# Patient Record
Sex: FEMALE | Race: WHITE | NOT HISPANIC OR LATINO | Employment: FULL TIME | URBAN - METROPOLITAN AREA
[De-identification: names, ages, dates, MRNs, and addresses within clinical notes are randomized per-mention and may not be internally consistent; named-entity substitution may affect disease eponyms.]

---

## 2023-03-06 ENCOUNTER — TELEPHONE (OUTPATIENT)
Dept: PRIMARY CARE | Facility: CLINIC | Age: 43
End: 2023-03-06

## 2023-03-06 RX ORDER — LOVASTATIN 20 MG/1
1 TABLET ORAL DAILY
COMMUNITY
Start: 2020-10-29 | End: 2023-05-03 | Stop reason: SDUPTHER

## 2023-03-06 RX ORDER — CITALOPRAM 10 MG/1
1 TABLET ORAL DAILY
COMMUNITY
Start: 2022-08-31 | End: 2023-05-03 | Stop reason: SDUPTHER

## 2023-03-06 RX ORDER — VIT C/ZN GLUC/HERBAL NO.325 90 MG-15MG
LOZENGE MUCOUS MEMBRANE
COMMUNITY
Start: 2021-11-12

## 2023-03-06 RX ORDER — ENALAPRIL MALEATE 2.5 MG/1
1 TABLET ORAL DAILY
COMMUNITY
Start: 2016-03-26 | End: 2023-05-03 | Stop reason: SDUPTHER

## 2023-03-06 NOTE — TELEPHONE ENCOUNTER
Rx Refill Request Telephone Encounter    Name:  Sandy Nguyen  :  [id number]  Medication Name:  citalopram hydro 10mg 1 tablet daily          Specific Pharmacy location:   Date of last appointment:    Date of next appointment:    Best number to reach patient:

## 2023-05-03 ENCOUNTER — LAB (OUTPATIENT)
Dept: LAB | Facility: LAB | Age: 43
End: 2023-05-03
Payer: COMMERCIAL

## 2023-05-03 ENCOUNTER — OFFICE VISIT (OUTPATIENT)
Dept: PRIMARY CARE | Facility: CLINIC | Age: 43
End: 2023-05-03
Payer: COMMERCIAL

## 2023-05-03 VITALS — BODY MASS INDEX: 32.28 KG/M2 | SYSTOLIC BLOOD PRESSURE: 124 MMHG | WEIGHT: 191 LBS | DIASTOLIC BLOOD PRESSURE: 76 MMHG

## 2023-05-03 DIAGNOSIS — E78.41 ELEVATED LIPOPROTEIN(A): ICD-10-CM

## 2023-05-03 DIAGNOSIS — F32.9 REACTIVE DEPRESSION: ICD-10-CM

## 2023-05-03 DIAGNOSIS — I10 PRIMARY HYPERTENSION: ICD-10-CM

## 2023-05-03 DIAGNOSIS — I10 PRIMARY HYPERTENSION: Primary | ICD-10-CM

## 2023-05-03 LAB
ALANINE AMINOTRANSFERASE (SGPT) (U/L) IN SER/PLAS: 20 U/L (ref 7–45)
ALBUMIN (G/DL) IN SER/PLAS: 4.6 G/DL (ref 3.4–5)
ALKALINE PHOSPHATASE (U/L) IN SER/PLAS: 90 U/L (ref 33–110)
ANION GAP IN SER/PLAS: 12 MMOL/L (ref 10–20)
ASPARTATE AMINOTRANSFERASE (SGOT) (U/L) IN SER/PLAS: 22 U/L (ref 9–39)
BILIRUBIN TOTAL (MG/DL) IN SER/PLAS: 0.4 MG/DL (ref 0–1.2)
CALCIUM (MG/DL) IN SER/PLAS: 9.5 MG/DL (ref 8.6–10.3)
CARBON DIOXIDE, TOTAL (MMOL/L) IN SER/PLAS: 27 MMOL/L (ref 21–32)
CHLORIDE (MMOL/L) IN SER/PLAS: 105 MMOL/L (ref 98–107)
CHOLESTEROL (MG/DL) IN SER/PLAS: 191 MG/DL (ref 0–199)
CHOLESTEROL IN HDL (MG/DL) IN SER/PLAS: 59.9 MG/DL
CHOLESTEROL/HDL RATIO: 3.2
CREATININE (MG/DL) IN SER/PLAS: 0.66 MG/DL (ref 0.5–1.05)
ERYTHROCYTE DISTRIBUTION WIDTH (RATIO) BY AUTOMATED COUNT: 12.7 % (ref 11.5–14.5)
ERYTHROCYTE MEAN CORPUSCULAR HEMOGLOBIN CONCENTRATION (G/DL) BY AUTOMATED: 33.9 G/DL (ref 32–36)
ERYTHROCYTE MEAN CORPUSCULAR VOLUME (FL) BY AUTOMATED COUNT: 94 FL (ref 80–100)
ERYTHROCYTES (10*6/UL) IN BLOOD BY AUTOMATED COUNT: 4.91 X10E12/L (ref 4–5.2)
GFR FEMALE: >90 ML/MIN/1.73M2
GLUCOSE (MG/DL) IN SER/PLAS: 94 MG/DL (ref 74–99)
HEMATOCRIT (%) IN BLOOD BY AUTOMATED COUNT: 46 % (ref 36–46)
HEMOGLOBIN (G/DL) IN BLOOD: 15.6 G/DL (ref 12–16)
LDL: 114 MG/DL (ref 0–99)
LEUKOCYTES (10*3/UL) IN BLOOD BY AUTOMATED COUNT: 5.7 X10E9/L (ref 4.4–11.3)
PLATELETS (10*3/UL) IN BLOOD AUTOMATED COUNT: 395 X10E9/L (ref 150–450)
POTASSIUM (MMOL/L) IN SER/PLAS: 4.2 MMOL/L (ref 3.5–5.3)
PROTEIN TOTAL: 7.1 G/DL (ref 6.4–8.2)
SODIUM (MMOL/L) IN SER/PLAS: 140 MMOL/L (ref 136–145)
THYROTROPIN (MIU/L) IN SER/PLAS BY DETECTION LIMIT <= 0.05 MIU/L: 0.54 MIU/L (ref 0.44–3.98)
TRIGLYCERIDE (MG/DL) IN SER/PLAS: 86 MG/DL (ref 0–149)
UREA NITROGEN (MG/DL) IN SER/PLAS: 7 MG/DL (ref 6–23)
VLDL: 17 MG/DL (ref 0–40)

## 2023-05-03 PROCEDURE — 99213 OFFICE O/P EST LOW 20 MIN: CPT | Performed by: FAMILY MEDICINE

## 2023-05-03 PROCEDURE — 80061 LIPID PANEL: CPT

## 2023-05-03 PROCEDURE — 3078F DIAST BP <80 MM HG: CPT | Performed by: FAMILY MEDICINE

## 2023-05-03 PROCEDURE — 84443 ASSAY THYROID STIM HORMONE: CPT

## 2023-05-03 PROCEDURE — 80053 COMPREHEN METABOLIC PANEL: CPT

## 2023-05-03 PROCEDURE — 3074F SYST BP LT 130 MM HG: CPT | Performed by: FAMILY MEDICINE

## 2023-05-03 PROCEDURE — 85027 COMPLETE CBC AUTOMATED: CPT

## 2023-05-03 PROCEDURE — 36415 COLL VENOUS BLD VENIPUNCTURE: CPT

## 2023-05-03 RX ORDER — TRIAMCINOLONE ACETONIDE 1 MG/G
CREAM TOPICAL
COMMUNITY
Start: 2023-01-13

## 2023-05-03 RX ORDER — ENALAPRIL MALEATE 2.5 MG/1
2.5 TABLET ORAL DAILY
Qty: 90 TABLET | Refills: 3 | Status: SHIPPED | OUTPATIENT
Start: 2023-05-03 | End: 2023-06-28 | Stop reason: ALTCHOICE

## 2023-05-03 RX ORDER — CITALOPRAM 10 MG/1
10 TABLET ORAL DAILY
Qty: 90 TABLET | Refills: 3 | Status: SHIPPED | OUTPATIENT
Start: 2023-05-03 | End: 2023-09-27 | Stop reason: SDUPTHER

## 2023-05-03 RX ORDER — LOVASTATIN 20 MG/1
20 TABLET ORAL DAILY
Qty: 90 TABLET | Refills: 3 | Status: SHIPPED | OUTPATIENT
Start: 2023-05-03 | End: 2023-05-04 | Stop reason: ALTCHOICE

## 2023-05-03 RX ORDER — PHENTERMINE HYDROCHLORIDE 37.5 MG/1
37.5 TABLET ORAL
COMMUNITY
End: 2023-08-02 | Stop reason: SDUPTHER

## 2023-05-03 ASSESSMENT — ENCOUNTER SYMPTOMS
EYES NEGATIVE: 1
CARDIOVASCULAR NEGATIVE: 1
CONSTITUTIONAL NEGATIVE: 1
RESPIRATORY NEGATIVE: 1
NERVOUS/ANXIOUS: 1
ALLERGIC/IMMUNOLOGIC NEGATIVE: 1
NEUROLOGICAL NEGATIVE: 1
ENDOCRINE NEGATIVE: 1
MUSCULOSKELETAL NEGATIVE: 1
HEMATOLOGIC/LYMPHATIC NEGATIVE: 1
GASTROINTESTINAL NEGATIVE: 1

## 2023-05-03 NOTE — PROGRESS NOTES
Subjective   Patient ID: Sandy Nguyen is a 42 y.o. female who presents for Follow-up (4 mo follow up ).    ROV         Review of Systems   Constitutional: Negative.    HENT: Negative.     Eyes: Negative.    Respiratory: Negative.     Cardiovascular: Negative.    Gastrointestinal: Negative.    Endocrine: Negative.    Genitourinary: Negative.    Musculoskeletal: Negative.    Skin: Negative.    Allergic/Immunologic: Negative.    Neurological: Negative.    Hematological: Negative.    Psychiatric/Behavioral:  The patient is nervous/anxious.        Objective   /76 (BP Location: Right arm, Patient Position: Sitting)   Wt 86.6 kg (191 lb)   BMI 32.28 kg/m²     Physical Exam  Constitutional:       Appearance: She is obese.   HENT:      Head: Normocephalic and atraumatic.      Nose: Nose normal.   Eyes:      Pupils: Pupils are equal, round, and reactive to light.   Cardiovascular:      Rate and Rhythm: Normal rate and regular rhythm.   Pulmonary:      Effort: Pulmonary effort is normal.      Breath sounds: Normal breath sounds.   Musculoskeletal:         General: Normal range of motion.      Cervical back: Normal range of motion.   Skin:     General: Skin is warm.   Neurological:      General: No focal deficit present.      Mental Status: She is alert.   Psychiatric:         Mood and Affect: Mood normal.         Assessment/Plan

## 2023-05-03 NOTE — PATIENT INSTRUCTIONS
Rx , lab s, FUWOD's , contd meds , diet ,daily X's ,stop smoking , weight reduction / weight loss clinic, tcbx 1wk , rto x 4m

## 2023-05-04 DIAGNOSIS — E78.41 ELEVATED LIPOPROTEIN(A): Primary | ICD-10-CM

## 2023-05-04 RX ORDER — LOVASTATIN 20 MG/1
20 TABLET ORAL DAILY
Qty: 90 TABLET | Refills: 3 | Status: CANCELLED | OUTPATIENT
Start: 2023-05-04

## 2023-05-05 RX ORDER — LOVASTATIN 40 MG/1
40 TABLET ORAL DAILY
Qty: 90 TABLET | Refills: 3 | Status: SHIPPED | OUTPATIENT
Start: 2023-05-05 | End: 2023-06-28

## 2023-06-28 ENCOUNTER — OFFICE VISIT (OUTPATIENT)
Dept: PRIMARY CARE | Facility: CLINIC | Age: 43
End: 2023-06-28
Payer: COMMERCIAL

## 2023-06-28 VITALS
OXYGEN SATURATION: 98 % | HEART RATE: 93 BPM | SYSTOLIC BLOOD PRESSURE: 118 MMHG | WEIGHT: 185 LBS | BODY MASS INDEX: 30.82 KG/M2 | HEIGHT: 65 IN | DIASTOLIC BLOOD PRESSURE: 82 MMHG | RESPIRATION RATE: 16 BRPM

## 2023-06-28 DIAGNOSIS — F41.9 ANXIETY: ICD-10-CM

## 2023-06-28 DIAGNOSIS — E78.00 PURE HYPERCHOLESTEROLEMIA: ICD-10-CM

## 2023-06-28 DIAGNOSIS — E66.09 CLASS 1 OBESITY DUE TO EXCESS CALORIES WITHOUT SERIOUS COMORBIDITY WITH BODY MASS INDEX (BMI) OF 31.0 TO 31.9 IN ADULT: ICD-10-CM

## 2023-06-28 DIAGNOSIS — F32.9 REACTIVE DEPRESSION: Primary | ICD-10-CM

## 2023-06-28 DIAGNOSIS — I10 PRIMARY HYPERTENSION: ICD-10-CM

## 2023-06-28 PROBLEM — E61.1 IRON DEFICIENCY: Status: ACTIVE | Noted: 2023-06-28

## 2023-06-28 PROBLEM — L30.9 ECZEMA: Status: ACTIVE | Noted: 2023-06-28

## 2023-06-28 PROBLEM — E53.8 VITAMIN B 12 DEFICIENCY: Status: ACTIVE | Noted: 2023-06-28

## 2023-06-28 PROBLEM — E66.811 CLASS 1 OBESITY DUE TO EXCESS CALORIES WITHOUT SERIOUS COMORBIDITY WITH BODY MASS INDEX (BMI) OF 31.0 TO 31.9 IN ADULT: Status: ACTIVE | Noted: 2023-06-28

## 2023-06-28 PROBLEM — G62.9 PERIPHERAL NEUROPATHY: Status: ACTIVE | Noted: 2023-06-28

## 2023-06-28 PROBLEM — E28.2 PCOS (POLYCYSTIC OVARIAN SYNDROME): Status: ACTIVE | Noted: 2023-06-28

## 2023-06-28 PROCEDURE — 99214 OFFICE O/P EST MOD 30 MIN: CPT | Performed by: FAMILY MEDICINE

## 2023-06-28 RX ORDER — LISINOPRIL 5 MG/1
5 TABLET ORAL DAILY
Qty: 90 TABLET | Refills: 1 | Status: SHIPPED | OUTPATIENT
Start: 2023-06-28 | End: 2023-09-27 | Stop reason: SDUPTHER

## 2023-06-28 RX ORDER — ATORVASTATIN CALCIUM 20 MG/1
20 TABLET, FILM COATED ORAL DAILY
Qty: 90 TABLET | Refills: 1 | Status: SHIPPED | OUTPATIENT
Start: 2023-06-28 | End: 2023-09-27 | Stop reason: SDUPTHER

## 2023-06-28 ASSESSMENT — ENCOUNTER SYMPTOMS: HYPERTENSION: 1

## 2023-06-28 NOTE — PROGRESS NOTES
Subjective   Patient ID: Sandy Nguyen is a 42 y.o. female who presents for Establish Care and Hypertension.    Pt is here to establish care. Pt states there are no concerns.    Hypertension  This is a recurrent problem. The current episode started more than 1 year ago. The problem is controlled. The current treatment provides significant improvement.        Review of Systems  12 Systems have been reviewed as follows.  Constitutional: Fever, weight gain, weight loss, appetite change, night sweats, fatigue, chills.  Eyes : blurry, double vision, vision, loss, tearing, redness, pain, sensitivity to light, glaucoma.  Ears, nose, mouth, and throat: Hearing loss, ringing in the ears, ear pain, nasal congestion, nasal drainage, nosebleeds, mouth, throat, irritation tooth problem.  Cardiovascular :chest pain, pressure, heart racing, palpitations, sweating, leg swelling, high or low blood pressure  Pulmonary: Cough, yellow or green sputum, blood and sputum, shortness of breath, wheezing  Gastrointestinal: Nausea, vomiting, diarrhea, constipation, pain, blood in stool, or vomitus, heartburn, difficulty swallowing  Genitourinary: incontinence, abnormal bleeding, abnormal discharge, urinary frequency, urinary hesitancy, pain, impotence sexual problem, infection, urinary retention  Musculoskeletal: Pain, stiffness, joint, redness or warmth, arthritis, back pain, weakness, muscle wasting, sprain or fracture  Neuro: Weight weakness, dizziness, change in voice, change in taste change in vision, change in hearing, loss, or change of sensation, trouble walking, balance problems coordination problems, shaking, speech problem  Endocrine , cold or heat intolerance, blood sugar problem, weight gain or loss missed periods hot flashes, sweats, change in body hair, change in libido, increased thirst, increased urination  Heme/lymph: Swelling, bleeding, problem anemia, bruising, enlarged lymph nodes  Allergic/immunologic: H. plus nasal  "drip, watery itchy eyes, nasal drainage, immunosuppressed  The above were reviewed and noted negative except as noted in HPI and Problem List.    Objective   /82 (BP Location: Right arm, Patient Position: Sitting, BP Cuff Size: Small adult)   Pulse 93   Resp 16   Ht 1.638 m (5' 4.5\")   Wt 83.9 kg (185 lb)   SpO2 98%   BMI 31.26 kg/m²     Physical Exam  Constitutional: Well developed, well nourished, alert and in no acute distress   Eyes: Normal external exam. Pupils equally round and reactive to light with normal accommodation and extraocular movements intact.  Neck: Supple, no lymphadenopathy or masses.   Cardiovascular: Regular rate and rhythm, normal S1 and S2, no murmurs, gallops, or rubs. Radial pulses normal. No peripheral edema.  Pulmonary: No respiratory distress, lungs clear to auscultation bilaterally. No wheezes, rhonchi, rales.  Abdomen: soft,non tender, non distended, without masses or HSM  Skin: Warm, well perfused, normal skin turgor and color.   Neurologic: Cranial nerves II-XII grossly intact.   Psychiatric: Mood calm and affect normal  Musculoskeletal: Moving all extremities without restriction    Assessment/Plan   Problem List Items Addressed This Visit       Hypertension    Relevant Medications    lisinopril 5 mg tablet    Other Relevant Orders    Follow Up In [facility] - PCP - Established    Reactive depression - Primary    Anxiety    Relevant Orders    Follow Up In [facility] - PCP - Established    Pure hypercholesterolemia    Relevant Medications    atorvastatin (Lipitor) 20 mg tablet    Other Relevant Orders    Follow Up In [facility] - PCP - Established    Class 1 obesity due to excess calories without serious comorbidity with body mass index (BMI) of 31.0 to 31.9 in adult    Relevant Orders    Follow Up In [facility] - PCP - Established            Continue current medications and therapy for chronic medical conditions    Provider " Attestation - Scribe documentation    All medical record entries made by the Scribe were at my direction and personally dictated by me. I have reviewed the chart and agree that the record accurately reflects my personal performance of the history, physical exam, discussion and plan.    Scribe Attestation  By signing my name below, I, Ruben Ruiz MD   , Scribe   attest that this documentation has been prepared under the direction and in the presence of Ruben Ruiz MD.      Discontinue enalapril  Start lisinopril 5 mg daily    Discontinue lovastatin  Start lipitor 20 mg daily    Continue adipex

## 2023-08-02 ENCOUNTER — OFFICE VISIT (OUTPATIENT)
Dept: PRIMARY CARE | Facility: CLINIC | Age: 43
End: 2023-08-02
Payer: COMMERCIAL

## 2023-08-02 VITALS
OXYGEN SATURATION: 97 % | SYSTOLIC BLOOD PRESSURE: 108 MMHG | HEIGHT: 64 IN | DIASTOLIC BLOOD PRESSURE: 86 MMHG | WEIGHT: 183 LBS | RESPIRATION RATE: 16 BRPM | HEART RATE: 100 BPM | BODY MASS INDEX: 31.24 KG/M2

## 2023-08-02 DIAGNOSIS — F41.9 ANXIETY: ICD-10-CM

## 2023-08-02 DIAGNOSIS — E78.00 PURE HYPERCHOLESTEROLEMIA: ICD-10-CM

## 2023-08-02 DIAGNOSIS — I10 PRIMARY HYPERTENSION: ICD-10-CM

## 2023-08-02 DIAGNOSIS — E66.09 CLASS 1 OBESITY DUE TO EXCESS CALORIES WITHOUT SERIOUS COMORBIDITY WITH BODY MASS INDEX (BMI) OF 31.0 TO 31.9 IN ADULT: ICD-10-CM

## 2023-08-02 PROBLEM — R10.2 FEMALE PELVIC PAIN: Status: ACTIVE | Noted: 2023-08-02

## 2023-08-02 PROBLEM — M19.90 OSTEOARTHROSIS: Status: ACTIVE | Noted: 2023-08-02

## 2023-08-02 PROBLEM — N61.0 MASTITIS, ACUTE: Status: ACTIVE | Noted: 2023-08-02

## 2023-08-02 PROBLEM — L73.2 VULVAL HIDRADENITIS SUPPURATIVA: Status: ACTIVE | Noted: 2023-08-02

## 2023-08-02 PROBLEM — L68.9 EXCESS BODY AND FACIAL HAIR: Status: ACTIVE | Noted: 2023-08-02

## 2023-08-02 PROBLEM — F17.200 NICOTINE DEPENDENCE: Status: ACTIVE | Noted: 2023-08-02

## 2023-08-02 PROBLEM — B37.31 YEAST INFECTION OF THE VAGINA: Status: ACTIVE | Noted: 2023-08-02

## 2023-08-02 PROBLEM — R00.2 PALPITATIONS: Status: ACTIVE | Noted: 2023-08-02

## 2023-08-02 PROCEDURE — 3074F SYST BP LT 130 MM HG: CPT | Performed by: FAMILY MEDICINE

## 2023-08-02 PROCEDURE — 3079F DIAST BP 80-89 MM HG: CPT | Performed by: FAMILY MEDICINE

## 2023-08-02 PROCEDURE — 99214 OFFICE O/P EST MOD 30 MIN: CPT | Performed by: FAMILY MEDICINE

## 2023-08-02 PROCEDURE — 4004F PT TOBACCO SCREEN RCVD TLK: CPT | Performed by: FAMILY MEDICINE

## 2023-08-02 PROCEDURE — 3008F BODY MASS INDEX DOCD: CPT | Performed by: FAMILY MEDICINE

## 2023-08-02 RX ORDER — PHENTERMINE HYDROCHLORIDE 37.5 MG/1
37.5 TABLET ORAL
Qty: 30 TABLET | Refills: 0 | Status: SHIPPED | OUTPATIENT
Start: 2023-08-02 | End: 2023-08-31 | Stop reason: SDUPTHER

## 2023-08-02 ASSESSMENT — ENCOUNTER SYMPTOMS
HYPERTENSION: 1
FEVER: 0
RECTAL PAIN: 0
STRIDOR: 0
COLOR CHANGE: 0
PALPITATIONS: 0
BLOOD IN STOOL: 0
SLEEP DISTURBANCE: 0
SORE THROAT: 0
COUGH: 0
ABDOMINAL PAIN: 0
DYSPHORIC MOOD: 0
RHINORRHEA: 0
HEMATURIA: 0
AGITATION: 0
CONFUSION: 0
POLYDIPSIA: 0
DIARRHEA: 0
FLANK PAIN: 0
DYSURIA: 0
ADENOPATHY: 0
EYE PAIN: 0
MYALGIAS: 0
POLYPHAGIA: 0
FATIGUE: 0
ABDOMINAL DISTENTION: 0
DIZZINESS: 0
ARTHRALGIAS: 0
ACTIVITY CHANGE: 0
PHOTOPHOBIA: 0
SPEECH DIFFICULTY: 0
SINUS PRESSURE: 0
APPETITE CHANGE: 0
NECK STIFFNESS: 0
SINUS PAIN: 0
SHORTNESS OF BREATH: 0
NERVOUS/ANXIOUS: 0
CHEST TIGHTNESS: 0
CONSTITUTIONAL NEGATIVE: 1
HEADACHES: 0
DECREASED CONCENTRATION: 0
CONSTIPATION: 0
TROUBLE SWALLOWING: 0
SEIZURES: 0

## 2023-08-02 NOTE — PROGRESS NOTES
Subjective   Patient ID: Sandy Nguyen is a 42 y.o. female who presents for Hypertension (This patient is here today to follow up on HTN. This patient is currently taking lisinopril . This patient states that their current medication treatment for this issue is working well for them. This patient does take their blood pressure at home and states that it is usually within normal ranges. This patient denies any symptoms of headache, dizziness, blurry vision, or lightheadedness./) and Weight Management (Patient presents in office for a follow up of weight management. Patient is currently being prescribed Adipex. Patient denies experiencing any adverse side effects from the current prescribed medication. Patient is currently down 2 lbs since last visit. ).    Weight Management-    Patient presents in office for a follow up of weight management. Patient is currently being prescribed Adipex. Patient denies experiencing any adverse side effects from the current prescribed medication. Patient is currently down 2 lbs since last visit.     Hypertension  This is a recurrent problem. The current episode started more than 1 month ago. The problem is unchanged. The problem is controlled. Pertinent negatives include no chest pain, headaches, palpitations or shortness of breath. There are no associated agents to hypertension. Risk factors for coronary artery disease include obesity, dyslipidemia and stress. Past treatments include ACE inhibitors. The current treatment provides moderate improvement. There are no compliance problems.       Review of Systems   Constitutional: Negative.  Negative for activity change, appetite change, fatigue and fever.   HENT:  Negative for congestion, dental problem, ear discharge, ear pain, mouth sores, rhinorrhea, sinus pressure, sinus pain, sore throat, tinnitus and trouble swallowing.    Eyes:  Negative for photophobia, pain and visual disturbance.   Respiratory:  Negative for cough, chest  "tightness, shortness of breath and stridor.    Cardiovascular:  Negative for chest pain and palpitations.   Gastrointestinal:  Negative for abdominal distention, abdominal pain, blood in stool, constipation, diarrhea and rectal pain.   Endocrine: Negative for cold intolerance, heat intolerance, polydipsia, polyphagia and polyuria.   Genitourinary:  Negative for dysuria, flank pain, hematuria and urgency.   Musculoskeletal:  Negative for arthralgias, gait problem, myalgias and neck stiffness.   Skin:  Negative for color change and rash.   Allergic/Immunologic: Negative for environmental allergies and food allergies.   Neurological:  Negative for dizziness, seizures, syncope, speech difficulty and headaches.   Hematological:  Negative for adenopathy.   Psychiatric/Behavioral:  Negative for agitation, confusion, decreased concentration, dysphoric mood and sleep disturbance. The patient is not nervous/anxious.      Objective   /86   Pulse 100   Resp 16   Ht 1.626 m (5' 4\")   Wt 83 kg (183 lb)   SpO2 97%   BMI 31.41 kg/m²     Physical Exam  Constitutional: Well developed, well nourished, alert and in no acute distress   Eyes: Normal external exam. Pupils equally round and reactive to light with normal accommodation and extraocular movements intact.  Neck: Supple, no lymphadenopathy or masses.   Cardiovascular: Regular rate and rhythm, normal S1 and S2, no murmurs, gallops, or rubs. Radial pulses normal. No peripheral edema.  Pulmonary: No respiratory distress, lungs clear to auscultation bilaterally. No wheezes, rhonchi, rales.  Abdomen: soft,non tender, non distended, without masses or HSM  Skin: Warm, well perfused, normal skin turgor and color.   Neurologic: Cranial nerves II-XII grossly intact.   Psychiatric: Mood calm and affect normal  Musculoskeletal: Moving all extremities without restriction     Assessment/Plan   Problem List Items Addressed This Visit       Hypertension    Relevant Orders    Follow " Up In [facility] - PCP - Established    Anxiety    Relevant Orders    Follow Up In [facility] - PCP - Established    Pure hypercholesterolemia    Relevant Orders    Follow Up In [facility] - PCP - Established    Class 1 obesity due to excess calories without serious comorbidity with body mass index (BMI) of 31.0 to 31.9 in adult    Relevant Orders    Follow Up In [facility] - PCP - Established    BMI 31.0-31.9,adult    Relevant Medications    phentermine (Adipex-P) 37.5 mg tablet    Other Relevant Orders    Follow Up In [facility] - PCP - Established     Patient was advised importance of proper diet/nutrition in addition adequate hydration. Patient was encouraged moderate exercise program to include 30 minutes daily for 5 days of the week or 150 minutes weekly. Patient will follow-up with us as scheduled.     Consider Ozempic or Wegovy in future.     CSA next    I have personally reviewed the OARRS report with the patient and have considered the risk of abuse, addiction, dependence and diversion.     Patient's use of medication is allowing patient to be able to perform  ADL's. Patient is always being evaluated for the possibility of lowering the medication dosage.     continue all current medications and therapy for chronic medical conditions     Scribe Attestation  By signing my name below, IDilip RMA   , Luis   attest that this documentation has been prepared under the direction and in the presence of Ruben Ruiz MD.     Provider Attestation - Scribe documentation    All medical record entries made by the Scribe were at my direction and personally dictated by me. I have reviewed the chart and agree that the record accurately reflects my personal performance of the history, physical exam, discussion and plan.

## 2023-08-31 NOTE — TELEPHONE ENCOUNTER
Dr Ruiz pt    Pt phoned office and is requesting refill on     Phentermine    [address]    Pt took last of medication today and has apt 9/5/23

## 2023-09-01 RX ORDER — PHENTERMINE HYDROCHLORIDE 37.5 MG/1
37.5 TABLET ORAL
Qty: 30 TABLET | Refills: 0 | Status: SHIPPED | OUTPATIENT
Start: 2023-09-01 | End: 2023-09-27 | Stop reason: SDUPTHER

## 2023-09-06 ENCOUNTER — TELEMEDICINE (OUTPATIENT)
Dept: PRIMARY CARE | Facility: CLINIC | Age: 43
End: 2023-09-06
Payer: COMMERCIAL

## 2023-09-06 DIAGNOSIS — E66.09 CLASS 1 OBESITY DUE TO EXCESS CALORIES WITHOUT SERIOUS COMORBIDITY WITH BODY MASS INDEX (BMI) OF 31.0 TO 31.9 IN ADULT: ICD-10-CM

## 2023-09-06 DIAGNOSIS — F41.9 ANXIETY: ICD-10-CM

## 2023-09-06 DIAGNOSIS — E78.00 PURE HYPERCHOLESTEROLEMIA: ICD-10-CM

## 2023-09-06 DIAGNOSIS — U07.1 COVID-19: Primary | ICD-10-CM

## 2023-09-06 DIAGNOSIS — I10 PRIMARY HYPERTENSION: ICD-10-CM

## 2023-09-06 PROBLEM — L73.2 HIDRADENITIS SUPPURATIVA: Status: ACTIVE | Noted: 2022-12-07

## 2023-09-06 PROCEDURE — 99214 OFFICE O/P EST MOD 30 MIN: CPT | Performed by: FAMILY MEDICINE

## 2023-09-06 RX ORDER — CLINDAMYCIN PHOSPHATE 10 UG/ML
LOTION TOPICAL 2 TIMES DAILY
COMMUNITY
Start: 2022-12-07

## 2023-09-06 RX ORDER — PHENTERMINE HYDROCHLORIDE 37.5 MG/1
37.5 TABLET ORAL
Qty: 30 TABLET | Refills: 0 | Status: CANCELLED | OUTPATIENT
Start: 2023-09-06

## 2023-09-06 RX ORDER — NIRMATRELVIR AND RITONAVIR 300-100 MG
3 KIT ORAL 2 TIMES DAILY
Qty: 30 TABLET | Refills: 0 | Status: SHIPPED | OUTPATIENT
Start: 2023-09-06 | End: 2023-09-11

## 2023-09-06 ASSESSMENT — ENCOUNTER SYMPTOMS
DYSPHORIC MOOD: 0
PHOTOPHOBIA: 0
NERVOUS/ANXIOUS: 0
SHORTNESS OF BREATH: 0
ADENOPATHY: 0
ABDOMINAL DISTENTION: 0
HEMATURIA: 0
CONSTIPATION: 0
HYPERTENSION: 1
SLEEP DISTURBANCE: 0
CONSTITUTIONAL NEGATIVE: 1
POLYPHAGIA: 0
CHEST TIGHTNESS: 0
DIZZINESS: 0
FEVER: 0
NECK STIFFNESS: 0
COUGH: 0
FATIGUE: 0
SORE THROAT: 0
EYE PAIN: 0
ARTHRALGIAS: 0
SPEECH DIFFICULTY: 0
BLOOD IN STOOL: 0
DECREASED CONCENTRATION: 0
HEADACHES: 0
SEIZURES: 0
PALPITATIONS: 0
ABDOMINAL PAIN: 0
DYSURIA: 0
STRIDOR: 0
RECTAL PAIN: 0
SINUS PRESSURE: 0
DIARRHEA: 0
RHINORRHEA: 0
ACTIVITY CHANGE: 0
POLYDIPSIA: 0
CONFUSION: 0
COLOR CHANGE: 0
AGITATION: 0
SINUS PAIN: 0
MYALGIAS: 0
FLANK PAIN: 0
APPETITE CHANGE: 0
TROUBLE SWALLOWING: 0

## 2023-09-06 NOTE — PROGRESS NOTES
Subjective   Patient ID: Sandy Nguyen is a 42 y.o. female who presents for Hypertension and covid infection.    Hypertension  Pertinent negatives include no chest pain, headaches, palpitations or shortness of breath.      Patient is having nasal drainage,fatigue, body aches, right calf pain, chills, warm, loss of appetite, stomach discomfort x 2 days. She tested negative yesterday but was positive today.     She would like refill of the adipex.     Review of Systems   Constitutional: Negative.  Negative for activity change, appetite change, fatigue and fever.   HENT:  Negative for congestion, dental problem, ear discharge, ear pain, mouth sores, rhinorrhea, sinus pressure, sinus pain, sore throat, tinnitus and trouble swallowing.    Eyes:  Negative for photophobia, pain and visual disturbance.   Respiratory:  Negative for cough, chest tightness, shortness of breath and stridor.    Cardiovascular:  Negative for chest pain and palpitations.   Gastrointestinal:  Negative for abdominal distention, abdominal pain, blood in stool, constipation, diarrhea and rectal pain.   Endocrine: Negative for cold intolerance, heat intolerance, polydipsia, polyphagia and polyuria.   Genitourinary:  Negative for dysuria, flank pain, hematuria and urgency.   Musculoskeletal:  Negative for arthralgias, gait problem, myalgias and neck stiffness.   Skin:  Negative for color change and rash.   Allergic/Immunologic: Negative for environmental allergies and food allergies.   Neurological:  Negative for dizziness, seizures, syncope, speech difficulty and headaches.   Hematological:  Negative for adenopathy.   Psychiatric/Behavioral:  Negative for agitation, confusion, decreased concentration, dysphoric mood and sleep disturbance. The patient is not nervous/anxious.        Objective   There were no vitals taken for this visit.    Physical Exam    Constitutional: Well developed, well nourished, alert and in no acute distress        Assessment/Plan   Problem List Items Addressed This Visit       Hypertension    Relevant Orders    Follow Up In [facility] - PCP - Established    Anxiety    Relevant Orders    Follow Up In [facility] - PCP - Established    Pure hypercholesterolemia    Relevant Orders    Follow Up In [facility] - PCP - Established    Class 1 obesity due to excess calories without serious comorbidity with body mass index (BMI) of 31.0 to 31.9 in adult    Relevant Orders    Follow Up In [facility] - PCP - Established    BMI 31.0-31.9,adult    Relevant Orders    Follow Up In [facility] - PCP - Established    COVID-19 - Primary    Relevant Medications    nirmatrelvir-ritonavir (Paxlovid) 300 mg (150 mg x 2)-100 mg tablet therapy pack    Other Relevant Orders    Follow Up In [facility] - PCP - Established     Hold lipitor x 5 days    See note 8/2    Consider wegovy next    Virtual Visit - Audio and Visual Communication Real Time     Continue current medications and therapy for chronic medical conditions

## 2023-09-20 ENCOUNTER — APPOINTMENT (OUTPATIENT)
Dept: PRIMARY CARE | Facility: CLINIC | Age: 43
End: 2023-09-20
Payer: COMMERCIAL

## 2023-09-27 ENCOUNTER — OFFICE VISIT (OUTPATIENT)
Dept: PRIMARY CARE | Facility: CLINIC | Age: 43
End: 2023-09-27
Payer: COMMERCIAL

## 2023-09-27 VITALS
DIASTOLIC BLOOD PRESSURE: 78 MMHG | BODY MASS INDEX: 30.9 KG/M2 | SYSTOLIC BLOOD PRESSURE: 118 MMHG | OXYGEN SATURATION: 98 % | HEART RATE: 101 BPM | HEIGHT: 64 IN | WEIGHT: 181 LBS

## 2023-09-27 DIAGNOSIS — I10 PRIMARY HYPERTENSION: ICD-10-CM

## 2023-09-27 DIAGNOSIS — E66.09 CLASS 1 OBESITY DUE TO EXCESS CALORIES WITHOUT SERIOUS COMORBIDITY WITH BODY MASS INDEX (BMI) OF 31.0 TO 31.9 IN ADULT: ICD-10-CM

## 2023-09-27 DIAGNOSIS — E78.00 PURE HYPERCHOLESTEROLEMIA: ICD-10-CM

## 2023-09-27 DIAGNOSIS — F32.9 REACTIVE DEPRESSION: ICD-10-CM

## 2023-09-27 PROBLEM — B37.31 YEAST INFECTION OF THE VAGINA: Status: RESOLVED | Noted: 2023-08-02 | Resolved: 2023-09-27

## 2023-09-27 PROBLEM — N61.0 MASTITIS, ACUTE: Status: RESOLVED | Noted: 2023-08-02 | Resolved: 2023-09-27

## 2023-09-27 PROBLEM — U07.1 COVID-19: Status: RESOLVED | Noted: 2023-09-06 | Resolved: 2023-09-27

## 2023-09-27 PROBLEM — R00.2 PALPITATIONS: Status: RESOLVED | Noted: 2023-08-02 | Resolved: 2023-09-27

## 2023-09-27 PROCEDURE — 99214 OFFICE O/P EST MOD 30 MIN: CPT | Performed by: FAMILY MEDICINE

## 2023-09-27 RX ORDER — SEMAGLUTIDE 0.25 MG/.5ML
0.25 INJECTION, SOLUTION SUBCUTANEOUS
Qty: 3 ML | Refills: 0 | Status: SHIPPED | OUTPATIENT
Start: 2023-09-27 | End: 2023-10-04

## 2023-09-27 RX ORDER — CITALOPRAM 20 MG/1
20 TABLET, FILM COATED ORAL DAILY
Qty: 90 TABLET | Refills: 1 | Status: SHIPPED | OUTPATIENT
Start: 2023-09-27 | End: 2024-03-22

## 2023-09-27 RX ORDER — LISINOPRIL 5 MG/1
5 TABLET ORAL DAILY
Qty: 90 TABLET | Refills: 1 | Status: SHIPPED | OUTPATIENT
Start: 2023-09-27 | End: 2024-03-22

## 2023-09-27 RX ORDER — ATORVASTATIN CALCIUM 20 MG/1
20 TABLET, FILM COATED ORAL DAILY
Qty: 90 TABLET | Refills: 1 | Status: SHIPPED | OUTPATIENT
Start: 2023-09-27 | End: 2024-03-25

## 2023-09-27 RX ORDER — PHENTERMINE HYDROCHLORIDE 37.5 MG/1
37.5 TABLET ORAL
Qty: 30 TABLET | Refills: 0 | Status: SHIPPED | OUTPATIENT
Start: 2023-09-27 | End: 2023-10-25 | Stop reason: SDUPTHER

## 2023-09-27 NOTE — PROGRESS NOTES
Subjective   Patient ID: Sandy Nguyen is a 42 y.o. female who presents for Weight Loss.     This patient is here today to follow up on obesity and weight gain. This patient is currently taking adipex as directed. This patient is tolerating this treatment well. This patient has lost 2 LB since their last visit. Good tolerance. Good symptom control. This patient is due for a refill today. This patient has no further complaints or symptoms at this time.          Review of Systems  12 Systems have been reviewed as follows.  Constitutional: Fever, weight gain, weight loss, appetite change, night sweats, fatigue, chills.  Eyes : blurry, double vision, vision, loss, tearing, redness, pain, sensitivity to light, glaucoma.  Ears, nose, mouth, and throat: Hearing loss, ringing in the ears, ear pain, nasal congestion, nasal drainage, nosebleeds, mouth, throat, irritation tooth problem.  Cardiovascular :chest pain, pressure, heart racing, palpitations, sweating, leg swelling, high or low blood pressure  Pulmonary: Cough, yellow or green sputum, blood and sputum, shortness of breath, wheezing  Gastrointestinal: Nausea, vomiting, diarrhea, constipation, pain, blood in stool, or vomitus, heartburn, difficulty swallowing  Genitourinary: incontinence, abnormal bleeding, abnormal discharge, urinary frequency, urinary hesitancy, pain, impotence sexual problem, infection, urinary retention  Musculoskeletal: Pain, stiffness, joint, redness or warmth, arthritis, back pain, weakness, muscle wasting, sprain or fracture  Neuro: Weight weakness, dizziness, change in voice, change in taste change in vision, change in hearing, loss, or change of sensation, trouble walking, balance problems coordination problems, shaking, speech problem  Endocrine , cold or heat intolerance, blood sugar problem, weight gain or loss missed periods hot flashes, sweats, change in body hair, change in libido, increased thirst, increased urination  Heme/lymph:  "Swelling, bleeding, problem anemia, bruising, enlarged lymph nodes  Allergic/immunologic: H. plus nasal drip, watery itchy eyes, nasal drainage, immunosuppressed  The above were reviewed and noted negative except as noted in HPI and Problem List.    Objective   /78   Pulse 101   Ht 1.626 m (5' 4\")   Wt 82.1 kg (181 lb)   SpO2 98%   BMI 31.07 kg/m²     Physical Exam  Constitutional: Well developed, well nourished, alert and in no acute distress   Eyes: Normal external exam. Pupils equally round and reactive to light with normal accommodation and extraocular movements intact.  Neck: Supple, no lymphadenopathy or masses.   Cardiovascular: Regular rate and rhythm, normal S1 and S2, no murmurs, gallops, or rubs. Radial pulses normal. No peripheral edema.  Pulmonary: No respiratory distress, lungs clear to auscultation bilaterally. No wheezes, rhonchi, rales.  Abdomen: soft,non tender, non distended, without masses or HSM  Skin: Warm, well perfused, normal skin turgor and color.   Neurologic: Cranial nerves II-XII grossly intact.   Psychiatric: Mood calm and affect normal  Musculoskeletal: Moving all extremities without restriction    Assessment/Plan   Problem List Items Addressed This Visit             ICD-10-CM    Hypertension I10    Relevant Medications    lisinopril 5 mg tablet    Other Relevant Orders    Follow Up In [facility] - PCP - Established    Reactive depression F32.9    Relevant Medications    citalopram (CeleXA) 20 mg tablet    Other Relevant Orders    Follow Up In [facility] - PCP - Established    Pure hypercholesterolemia E78.00    Relevant Medications    atorvastatin (Lipitor) 20 mg tablet    Other Relevant Orders    Follow Up In [facility] - PCP - Established    Class 1 obesity due to excess calories without serious comorbidity with body mass index (BMI) of 31.0 to 31.9 in adult E66.09, Z68.31    Relevant Medications    semaglutide, weight loss, (Wegovy) 0.25 " mg/0.5 mL pen injector    Other Relevant Orders    Follow Up In [facility] - PCP - Established    Follow Up In [facility] - Pharmacy    RESOLVED: BMI 31.0-31.9,adult Z68.31    Relevant Medications    phentermine (Adipex-P) 37.5 mg tablet    Other Relevant Orders    Follow Up In [facility] - PCP - Established          Continue current medications and therapy for chronic medical conditions    Provider Attestation - Scribe documentation    All medical record entries made by the Scribe were at my direction and personally dictated by me. I have reviewed the chart and agree that the record accurately reflects my personal performance of the history, physical exam, discussion and plan.    Scribe Attestation  By signing my name below, I, Ruben Ruiz MD   , Scribe   attest that this documentation has been prepared under the direction and in the presence of Ruben Ruiz MD.    [facility]  Pharmacy referral ordered    CSA next for adipex    I have personally reviewed the OARRS report with the patient and have considered the risk of abuse, addiction, dependence and diversion.

## 2023-10-04 ENCOUNTER — TELEMEDICINE CLINICAL SUPPORT (OUTPATIENT)
Dept: PHARMACY | Facility: HOSPITAL | Age: 43
End: 2023-10-04
Payer: COMMERCIAL

## 2023-10-04 DIAGNOSIS — E66.09 CLASS 1 OBESITY DUE TO EXCESS CALORIES WITHOUT SERIOUS COMORBIDITY WITH BODY MASS INDEX (BMI) OF 31.0 TO 31.9 IN ADULT: ICD-10-CM

## 2023-10-04 NOTE — PROGRESS NOTES
Pharmacist Clinic: Weight Management    Sandy Nguyen was referred to the Clinical Pharmacy Team for weight management.     Referring Provider: Ruben Ruiz MD  Problem List Items Addressed This Visit       Class 1 obesity due to excess calories without serious comorbidity with body mass index (BMI) of 31.0 to 31.9 in adult    Relevant Orders    Hemoglobin A1c     HISTORY OF PRESENT ILLNESS    Sandy Nguyen is a 42 year old female who was referred to the Clinical Pharmacy Team by Dr. Ruben Ruiz for weight management. Dr. Ruiz sent a prescription to patients preferred pharmacy for Wegovy, however this was not covered by insurance. Upon further investigation by the [facility], the patients insurance does not cover any medications for weight loss. Patient does not have an updated A1c, and would benefit from getting this done to see if patient is diabetic or prediabetic. Patient would potentially benefit from a once weekly injectable that is approved for diabetes. If patient is prediabetic or diabetic, will submit prior authorization for approval for Ozempic.    PHARMACY ASSESSMENT    Allergies: Penicillins     [id number] - [place], [place] - [address]  [address] [address]  Phone: [phone] Fax: [phone]    CURRENT PHARMACOTHERAPY  Adipex 37.5mg once daily     DRUG INTERACTIONS  No significant drug-drug interactions exist that require adjustment to therapy    LAB  Lab Results   Component Value Date    BILITOT 0.4 05/03/2023    CALCIUM 9.5 05/03/2023    CO2 27 05/03/2023     05/03/2023    CREATININE 0.66 05/03/2023    GLUCOSE 94 05/03/2023    ALKPHOS 90 05/03/2023    K 4.2 05/03/2023    PROT 7.1 05/03/2023     05/03/2023    AST 22 05/03/2023    ALT 20 05/03/2023    BUN 7 05/03/2023    ANIONGAP 12 05/03/2023    ALBUMIN 4.6 05/03/2023     Lab Results   Component Value Date    TRIG 86 05/03/2023    CHOL 191 05/03/2023    HDL 59.9 05/03/2023     No  "results found for: \"HGBA1C\"    Current Outpatient Medications on File Prior to Visit   Medication Sig Dispense Refill    phentermine (Adipex-P) 37.5 mg tablet Take 1 tablet (37.5 mg) by mouth once daily in the morning. Take before meals. 30 tablet 0    atorvastatin (Lipitor) 20 mg tablet Take 1 tablet (20 mg) by mouth once daily. 90 tablet 1    citalopram (CeleXA) 20 mg tablet Take 1 tablet (20 mg) by mouth once daily. 90 tablet 1    clindamycin (Cleocin T) 1 % lotion Apply topically 2 times a day.      lisinopril 5 mg tablet Take 1 tablet (5 mg) by mouth once daily. 90 tablet 1    multivitamin with minerals (multivitamin-iron-folic acid) tablet Take 1 tablet by mouth once daily.      triamcinolone (Kenalog) 0.1 % cream apply topically to hands 1 to 2 times daily as directed until resolved      vit C-Zn gluc-herbal no.325 (Elderberry Zinc Vit C) 90-15 mg lozenge Take by mouth.      [DISCONTINUED] semaglutide, weight loss, (Wegovy) 0.25 mg/0.5 mL pen injector Inject 0.25 mg under the skin every 7 days. 3 mL 0     No current facility-administered medications on file prior to visit.     PATIENT EDUCATION/GOALS  Counseled patient on MOA, expectations, side effects, duration of therapy, contraindications, administration, and monitoring parameters  Answered all patient questions and concerns; provided [facility] phone number if issues/questions arise    RECOMMENDATIONS/PLAN  Submitted an order for patient to get an A1c. Patient has not had an A1c done, and would benefit from having this completed. If patient is in prediabetes or diabetes range, may submit PA for Ozempic. Patient understands that she needs to go to a  Lab to complete the A1c.    Follow up with the Clinical Pharmacy Team once the A1c has resulted and can further discuss weight loss options.    Continue all meds under the continuation of care with the referring provider and clinical pharmacy team.    Please reach out to the Clinical Pharmacy Team if there are any " further questions.     Verbal consent to manage patient's drug therapy was obtained from patient. They were informed they may decline to participate or withdraw from participation in pharmacy services at any time.    Sultana Galeana, PharmD  [phone]

## 2023-10-25 ENCOUNTER — OFFICE VISIT (OUTPATIENT)
Dept: PRIMARY CARE | Facility: CLINIC | Age: 43
End: 2023-10-25
Payer: COMMERCIAL

## 2023-10-25 VITALS — BODY MASS INDEX: 31.07 KG/M2 | SYSTOLIC BLOOD PRESSURE: 128 MMHG | DIASTOLIC BLOOD PRESSURE: 86 MMHG | WEIGHT: 181 LBS

## 2023-10-25 DIAGNOSIS — I10 PRIMARY HYPERTENSION: ICD-10-CM

## 2023-10-25 DIAGNOSIS — Z23 FLU VACCINE NEED: ICD-10-CM

## 2023-10-25 DIAGNOSIS — F32.9 REACTIVE DEPRESSION: ICD-10-CM

## 2023-10-25 DIAGNOSIS — E66.09 CLASS 1 OBESITY DUE TO EXCESS CALORIES WITHOUT SERIOUS COMORBIDITY WITH BODY MASS INDEX (BMI) OF 31.0 TO 31.9 IN ADULT: ICD-10-CM

## 2023-10-25 DIAGNOSIS — E78.00 PURE HYPERCHOLESTEROLEMIA: ICD-10-CM

## 2023-10-25 PROCEDURE — 99214 OFFICE O/P EST MOD 30 MIN: CPT | Performed by: FAMILY MEDICINE

## 2023-10-25 PROCEDURE — 90471 IMMUNIZATION ADMIN: CPT | Performed by: FAMILY MEDICINE

## 2023-10-25 PROCEDURE — 3008F BODY MASS INDEX DOCD: CPT | Performed by: FAMILY MEDICINE

## 2023-10-25 PROCEDURE — 90686 IIV4 VACC NO PRSV 0.5 ML IM: CPT | Performed by: FAMILY MEDICINE

## 2023-10-25 PROCEDURE — 3074F SYST BP LT 130 MM HG: CPT | Performed by: FAMILY MEDICINE

## 2023-10-25 PROCEDURE — 4004F PT TOBACCO SCREEN RCVD TLK: CPT | Performed by: FAMILY MEDICINE

## 2023-10-25 PROCEDURE — 3079F DIAST BP 80-89 MM HG: CPT | Performed by: FAMILY MEDICINE

## 2023-10-25 RX ORDER — PHENTERMINE HYDROCHLORIDE 37.5 MG/1
37.5 TABLET ORAL
Qty: 30 TABLET | Refills: 0 | Status: SHIPPED | OUTPATIENT
Start: 2023-10-25 | End: 2023-12-21 | Stop reason: SDUPTHER

## 2023-10-25 ASSESSMENT — ENCOUNTER SYMPTOMS
RHINORRHEA: 0
HEMATURIA: 0
DYSPHORIC MOOD: 0
MYALGIAS: 0
SINUS PRESSURE: 0
ACTIVITY CHANGE: 0
PALPITATIONS: 0
POLYDIPSIA: 0
DIARRHEA: 0
NECK STIFFNESS: 0
SINUS PAIN: 0
ABDOMINAL DISTENTION: 0
CHEST TIGHTNESS: 0
CONSTIPATION: 0
ABDOMINAL PAIN: 0
COUGH: 0
CONFUSION: 0
ADENOPATHY: 0
DYSURIA: 0
BLOOD IN STOOL: 0
SHORTNESS OF BREATH: 0
FLANK PAIN: 0
FATIGUE: 0
TROUBLE SWALLOWING: 0
SLEEP DISTURBANCE: 0
SEIZURES: 0
SORE THROAT: 0
COLOR CHANGE: 0
HEADACHES: 0
RECTAL PAIN: 0
SPEECH DIFFICULTY: 0
APPETITE CHANGE: 0
PHOTOPHOBIA: 0
FEVER: 0
POLYPHAGIA: 0
EYE PAIN: 0
DIZZINESS: 0
AGITATION: 0
CONSTITUTIONAL NEGATIVE: 1
STRIDOR: 0
DECREASED CONCENTRATION: 0
ARTHRALGIAS: 0
NERVOUS/ANXIOUS: 0

## 2023-10-25 NOTE — PROGRESS NOTES
Subjective   Patient ID: Sandy Nguyen is a 42 y.o. female who presents for Weight Loss.      This patient is here today to follow up on obesity and weight gain. This patient is currently taking phentermine as directed. This patient is tolerating this treatment well. This patient has lost 0 LB since their last visit. Good tolerance. Good symptom control. This patient is due for a refill today. This patient has no further complaints or symptoms at this time.   Pt states the medication has stopped she isn't losing weight          Review of Systems   Constitutional: Negative.  Negative for activity change, appetite change, fatigue and fever.   HENT:  Negative for congestion, dental problem, ear discharge, ear pain, mouth sores, rhinorrhea, sinus pressure, sinus pain, sore throat, tinnitus and trouble swallowing.    Eyes:  Negative for photophobia, pain and visual disturbance.   Respiratory:  Negative for cough, chest tightness, shortness of breath and stridor.    Cardiovascular:  Negative for chest pain and palpitations.   Gastrointestinal:  Negative for abdominal distention, abdominal pain, blood in stool, constipation, diarrhea and rectal pain.   Endocrine: Negative for cold intolerance, heat intolerance, polydipsia, polyphagia and polyuria.   Genitourinary:  Negative for dysuria, flank pain, hematuria and urgency.   Musculoskeletal:  Negative for arthralgias, gait problem, myalgias and neck stiffness.   Skin:  Negative for color change and rash.   Allergic/Immunologic: Negative for environmental allergies and food allergies.   Neurological:  Negative for dizziness, seizures, syncope, speech difficulty and headaches.   Hematological:  Negative for adenopathy.   Psychiatric/Behavioral:  Negative for agitation, confusion, decreased concentration, dysphoric mood and sleep disturbance. The patient is not nervous/anxious.        Objective   /86   Wt 82.1 kg (181 lb)   BMI 31.07 kg/m²     Physical  Exam  Constitutional:       Appearance: Normal appearance.   HENT:      Head: Normocephalic and atraumatic.      Right Ear: Tympanic membrane, ear canal and external ear normal.      Left Ear: Tympanic membrane, ear canal and external ear normal.      Nose: Nose normal.      Mouth/Throat:      Mouth: Mucous membranes are moist.      Pharynx: Oropharynx is clear.   Eyes:      Extraocular Movements: Extraocular movements intact.      Conjunctiva/sclera: Conjunctivae normal.      Pupils: Pupils are equal, round, and reactive to light.   Cardiovascular:      Rate and Rhythm: Normal rate and regular rhythm.      Pulses: Normal pulses.      Heart sounds: Normal heart sounds.   Pulmonary:      Effort: Pulmonary effort is normal.      Breath sounds: Normal breath sounds.   Abdominal:      General: Abdomen is flat. Bowel sounds are normal.      Palpations: Abdomen is soft.   Musculoskeletal:         General: Normal range of motion.      Cervical back: Normal range of motion and neck supple.   Skin:     General: Skin is warm and dry.   Neurological:      General: No focal deficit present.      Mental Status: She is alert and oriented to person, place, and time.   Psychiatric:         Mood and Affect: Mood normal.         Behavior: Behavior normal.         Thought Content: Thought content normal.         Judgment: Judgment normal.         Assessment/Plan   Problem List Items Addressed This Visit             ICD-10-CM    Hypertension I10    Relevant Orders    Follow Up In [facility] - PCP - Established    Reactive depression F32.9    Relevant Orders    Follow Up In [facility] - PCP - Established    Pure hypercholesterolemia E78.00    Relevant Orders    Follow Up In [facility] - PCP - Established    Class 1 obesity due to excess calories without serious comorbidity with body mass index (BMI) of 31.0 to 31.9 in adult E66.09, Z68.31    Relevant Orders    Follow Up In [facility] - PCP -  Established     Other Visit Diagnoses         Codes    BMI 31.0-31.9,adult     Z68.31    Relevant Orders    Follow Up In [facility] - PCP - Established          Patient was advised importance of proper diet/nutrition in addition adequate hydration. Patient was encouraged moderate exercise program to include 30 minutes daily for 5 days of the week or 150 minutes weekly. Patient will follow-up with us as scheduled.     Patient informed to check with insurance in regards to Qsymia.     Insurance will not cover any weight loss injectables.     I have personally reviewed the OARRS report with the patient and have considered the risk of abuse, addiction, dependence and diversion.    continue all current medications and therapy for chronic medical conditions     Scribe Attestation  By signing my name below, I, NAIDA Lanier   , Luis   attest that this documentation has been prepared under the direction and in the presence of Ruben Ruiz MD.     Provider Attestation - Scribe documentation    All medical record entries made by the Scribe were at my direction and personally dictated by me. I have reviewed the chart and agree that the record accurately reflects my personal performance of the history, physical exam, discussion and plan.

## 2023-12-20 ENCOUNTER — APPOINTMENT (OUTPATIENT)
Dept: PRIMARY CARE | Facility: CLINIC | Age: 43
End: 2023-12-20
Payer: COMMERCIAL

## 2023-12-21 ENCOUNTER — OFFICE VISIT (OUTPATIENT)
Dept: PRIMARY CARE | Facility: CLINIC | Age: 43
End: 2023-12-21
Payer: COMMERCIAL

## 2023-12-21 VITALS
WEIGHT: 182.8 LBS | HEIGHT: 64 IN | SYSTOLIC BLOOD PRESSURE: 118 MMHG | RESPIRATION RATE: 16 BRPM | OXYGEN SATURATION: 98 % | BODY MASS INDEX: 31.21 KG/M2 | DIASTOLIC BLOOD PRESSURE: 76 MMHG | HEART RATE: 86 BPM

## 2023-12-21 DIAGNOSIS — E66.09 CLASS 1 OBESITY DUE TO EXCESS CALORIES WITHOUT SERIOUS COMORBIDITY WITH BODY MASS INDEX (BMI) OF 31.0 TO 31.9 IN ADULT: ICD-10-CM

## 2023-12-21 DIAGNOSIS — Z12.31 ENCOUNTER FOR SCREENING MAMMOGRAM FOR MALIGNANT NEOPLASM OF BREAST: ICD-10-CM

## 2023-12-21 DIAGNOSIS — F32.9 REACTIVE DEPRESSION: ICD-10-CM

## 2023-12-21 DIAGNOSIS — E78.00 PURE HYPERCHOLESTEROLEMIA: ICD-10-CM

## 2023-12-21 DIAGNOSIS — I10 PRIMARY HYPERTENSION: ICD-10-CM

## 2023-12-21 PROCEDURE — 99214 OFFICE O/P EST MOD 30 MIN: CPT | Performed by: FAMILY MEDICINE

## 2023-12-21 RX ORDER — PHENTERMINE HYDROCHLORIDE 37.5 MG/1
37.5 TABLET ORAL
Qty: 30 TABLET | Refills: 0 | Status: SHIPPED | OUTPATIENT
Start: 2023-12-21 | End: 2024-01-30 | Stop reason: SDUPTHER

## 2023-12-21 NOTE — PROGRESS NOTES
Subjective   Patient ID: Sandy Nguyen is a 43 y.o. female who presents for Obesity.    HPI Patient is following up on Obesity and weight loss and use of Adipex.   She will need refill of Adipex. She has lost the energy part of the Adipex. No weight loss today.    Review of Systems  12 Systems have been reviewed as follows.  Constitutional: Fever, weight gain, weight loss, appetite change, night sweats, fatigue, chills.  Eyes : blurry, double vision, vision, loss, tearing, redness, pain, sensitivity to light, glaucoma.  Ears, nose, mouth, and throat: Hearing loss, ringing in the ears, ear pain, nasal congestion, nasal drainage, nosebleeds, mouth, throat, irritation tooth problem.  Cardiovascular :chest pain, pressure, heart racing, palpitations, sweating, leg swelling, high or low blood pressure  Pulmonary: Cough, yellow or green sputum, blood and sputum, shortness of breath, wheezing  Gastrointestinal: Nausea, vomiting, diarrhea, constipation, pain, blood in stool, or vomitus, heartburn, difficulty swallowing  Genitourinary: incontinence, abnormal bleeding, abnormal discharge, urinary frequency, urinary hesitancy, pain, impotence sexual problem, infection, urinary retention  Musculoskeletal: Pain, stiffness, joint, redness or warmth, arthritis, back pain, weakness, muscle wasting, sprain or fracture  Neuro: Weight weakness, dizziness, change in voice, change in taste change in vision, change in hearing, loss, or change of sensation, trouble walking, balance problems coordination problems, shaking, speech problem  Endocrine , cold or heat intolerance, blood sugar problem, weight gain or loss missed periods hot flashes, sweats, change in body hair, change in libido, increased thirst, increased urination  Heme/lymph: Swelling, bleeding, problem anemia, bruising, enlarged lymph nodes  Allergic/immunologic: H. plus nasal drip, watery itchy eyes, nasal drainage, immunosuppressed  The above were reviewed and noted  "negative except as noted in HPI and Problem List.    Objective   /76 (BP Location: Left arm, Patient Position: Sitting, BP Cuff Size: Adult)   Pulse 86   Resp 16   Ht 1.626 m (5' 4\")   Wt 82.9 kg (182 lb 12.8 oz)   SpO2 98%   BMI 31.38 kg/m²     Physical Exam  Constitutional: Well developed, well nourished, alert and in no acute distress   Eyes: Normal external exam. Pupils equally round and reactive to light with normal accommodation and extraocular movements intact.  Neck: Supple, no lymphadenopathy or masses.   Cardiovascular: Regular rate and rhythm, normal S1 and S2, no murmurs, gallops, or rubs. Radial pulses normal. No peripheral edema.  Pulmonary: No respiratory distress, lungs clear to auscultation bilaterally. No wheezes, rhonchi, rales.  Abdomen: soft,non tender, non distended, without masses or HSM  Skin: Warm, well perfused, normal skin turgor and color.   Neurologic: Cranial nerves II-XII grossly intact.   Psychiatric: Mood calm and affect normal  Musculoskeletal: Moving all extremities without restriction      Assessment/Plan   Problem List Items Addressed This Visit             ICD-10-CM    Hypertension I10    Relevant Orders    Follow Up In [facility] - PCP - Established    Reactive depression F32.9    Relevant Orders    Follow Up [facility] - PCP - Established    Pure hypercholesterolemia E78.00    Relevant Orders    Follow Up In [facility] - PCP - Established    Class 1 obesity due to excess calories without serious comorbidity with body mass index (BMI) of 31.0 to 31.9 in adult E66.09, Z68.31    Relevant Orders    Follow Up In [facility] - PCP - Established     Other Visit Diagnoses         Codes    BMI 31.0-31.9,adult     Z68.31    Relevant Medications    phentermine (Adipex-P) 37.5 mg tablet    Other Relevant Orders    Follow Up In [facility] - PCP - Established    Encounter for screening mammogram for malignant neoplasm of " breast     Z12.31    Relevant Orders    BI mammo bilateral screening tomosynthesis    Follow Up In [facility] - PCP - Established          Insurance will not cover any weight loss injectables.      Patient informed to check with insurance in regards to Qsymia.     I have personally reviewed the OARRS report with the patient and have considered the risk of abuse, addiction, dependence and diversion.     Patient's use of medication is allowing patient to be able to perform  ADL's. Patient is always being evaluated for the possibility of lowering the medication dosage.     continue all current medications and therapy for chronic medical conditions     Scribe Attestation  By signing my name below, I, NAIDA Lanier   , Scrkamryn   attest that this documentation has been prepared under the direction and in the presence of Ruben Ruiz MD.     Provider Attestation - Scribe documentation    All medical record entries made by the Scribe were at my direction and personally dictated by me. I have reviewed the chart and agree that the record accurately reflects my personal performance of the history, physical exam, discussion and plan.

## 2024-01-04 ENCOUNTER — TELEPHONE (OUTPATIENT)
Dept: PRIMARY CARE | Facility: CLINIC | Age: 44
End: 2024-01-04

## 2024-01-04 DIAGNOSIS — Z12.31 ENCOUNTER FOR SCREENING MAMMOGRAM FOR MALIGNANT NEOPLASM OF BREAST: ICD-10-CM

## 2024-01-04 NOTE — TELEPHONE ENCOUNTER
PT NEEDS ORDER FOR DIAGNOSTIC MAMMOGRAM FOR HER LEFT BREAST PLEASE, CALL WHEN ORDER IS IN TO SCHEDULE.

## 2024-01-24 ENCOUNTER — HOSPITAL ENCOUNTER (OUTPATIENT)
Dept: RADIOLOGY | Facility: HOSPITAL | Age: 44
Discharge: HOME | End: 2024-01-24
Payer: COMMERCIAL

## 2024-01-24 DIAGNOSIS — Z12.31 ENCOUNTER FOR SCREENING MAMMOGRAM FOR MALIGNANT NEOPLASM OF BREAST: ICD-10-CM

## 2024-01-24 DIAGNOSIS — N64.4 MASTODYNIA: ICD-10-CM

## 2024-01-24 PROCEDURE — 77066 DX MAMMO INCL CAD BI: CPT | Performed by: RADIOLOGY

## 2024-01-24 PROCEDURE — 76642 ULTRASOUND BREAST LIMITED: CPT | Mod: RT

## 2024-01-24 PROCEDURE — 76642 ULTRASOUND BREAST LIMITED: CPT | Performed by: RADIOLOGY

## 2024-01-24 PROCEDURE — 77062 BREAST TOMOSYNTHESIS BI: CPT | Performed by: RADIOLOGY

## 2024-01-24 PROCEDURE — 77062 BREAST TOMOSYNTHESIS BI: CPT

## 2024-01-30 ENCOUNTER — TELEMEDICINE (OUTPATIENT)
Dept: PRIMARY CARE | Facility: CLINIC | Age: 44
End: 2024-01-30
Payer: COMMERCIAL

## 2024-01-30 DIAGNOSIS — E66.09 CLASS 1 OBESITY DUE TO EXCESS CALORIES WITHOUT SERIOUS COMORBIDITY WITH BODY MASS INDEX (BMI) OF 31.0 TO 31.9 IN ADULT: ICD-10-CM

## 2024-01-30 DIAGNOSIS — Z12.31 ENCOUNTER FOR SCREENING MAMMOGRAM FOR MALIGNANT NEOPLASM OF BREAST: ICD-10-CM

## 2024-01-30 DIAGNOSIS — F32.9 REACTIVE DEPRESSION: ICD-10-CM

## 2024-01-30 DIAGNOSIS — I10 PRIMARY HYPERTENSION: ICD-10-CM

## 2024-01-30 DIAGNOSIS — E78.00 PURE HYPERCHOLESTEROLEMIA: ICD-10-CM

## 2024-01-30 PROCEDURE — 99214 OFFICE O/P EST MOD 30 MIN: CPT | Performed by: FAMILY MEDICINE

## 2024-01-30 NOTE — PROGRESS NOTES
Subjective   Patient ID: Sandy Nguyen is a 43 y.o. female who presents for Obesity.    HPI Patient is following up on Adipex use for obesity and weight gain. She was at 182 lbs 12.8 oz at last visit with BMI o f31.38 kg/m3    Review of Systems  12 Systems have been reviewed as follows.  Constitutional: Fever, weight gain, weight loss, appetite change, night sweats, fatigue, chills.  Eyes : blurry, double vision, vision, loss, tearing, redness, pain, sensitivity to light, glaucoma.  Ears, nose, mouth, and throat: Hearing loss, ringing in the ears, ear pain, nasal congestion, nasal drainage, nosebleeds, mouth, throat, irritation tooth problem.  Cardiovascular :chest pain, pressure, heart racing, palpitations, sweating, leg swelling, high or low blood pressure  Pulmonary: Cough, yellow or green sputum, blood and sputum, shortness of breath, wheezing  Gastrointestinal: Nausea, vomiting, diarrhea, constipation, pain, blood in stool, or vomitus, heartburn, difficulty swallowing  Genitourinary: incontinence, abnormal bleeding, abnormal discharge, urinary frequency, urinary hesitancy, pain, impotence sexual problem, infection, urinary retention  Musculoskeletal: Pain, stiffness, joint, redness or warmth, arthritis, back pain, weakness, muscle wasting, sprain or fracture  Neuro: Weight weakness, dizziness, change in voice, change in taste change in vision, change in hearing, loss, or change of sensation, trouble walking, balance problems coordination problems, shaking, speech problem  Endocrine , cold or heat intolerance, blood sugar problem, weight gain or loss missed periods hot flashes, sweats, change in body hair, change in libido, increased thirst, increased urination  Heme/lymph: Swelling, bleeding, problem anemia, bruising, enlarged lymph nodes  Allergic/immunologic: H. plus nasal drip, watery itchy eyes, nasal drainage, immunosuppressed  The above were reviewed and noted negative except as noted in HPI and  Problem List.      Objective   There were no vitals taken for this visit.    Physical Exam  Constitutional: Well developed, well nourished, alert and in no acute distress     Assessment/Plan   Problem List Items Addressed This Visit             ICD-10-CM    Hypertension I10    Relevant Orders    Follow Up In [facility] - PCP - Established    Reactive depression F32.9    Relevant Orders    Follow Up In [facility] - PCP - Established    Pure hypercholesterolemia E78.00    Relevant Orders    Follow Up In [facility] - PCP - Established    Class 1 obesity due to excess calories without serious comorbidity with body mass index (BMI) of 31.0 to 31.9 in adult E66.09, Z68.31    Relevant Orders    Follow Up In [facility] - PCP - Established     Other Visit Diagnoses         Codes    BMI 31.0-31.9,adult     Z68.31    Relevant Medications    phentermine (Adipex-P) 37.5 mg tablet    Other Relevant Orders    Follow Up In [facility] - PCP - Established    Encounter for screening mammogram for malignant neoplasm of breast     Z12.31    Relevant Orders    Follow Up In [facility] - PCP - Established        Continue current medications and therapy for chronic medical conditions    Virtual Visit - Audio and Visual Communication Real Time     I have personally reviewed the OARRS report with the patient and have considered the risk of abuse, addiction, dependence and diversion.    Patient's use of medication is allowing patient to be able to perform ADL's. Patient is always being evaluated for the possibility of lowering the medication dosage.    Insurance will not cover any weight loss injectables.       Patient informed to check with insurance in regards to Qsymia.     Discuss humira for hidradenitis next    Check for pectoralis muscle strain next

## 2024-02-04 RX ORDER — PHENTERMINE HYDROCHLORIDE 37.5 MG/1
37.5 TABLET ORAL
Qty: 30 TABLET | Refills: 0 | Status: SHIPPED | OUTPATIENT
Start: 2024-02-04

## 2024-02-21 ENCOUNTER — APPOINTMENT (OUTPATIENT)
Dept: PRIMARY CARE | Facility: CLINIC | Age: 44
End: 2024-02-21
Payer: COMMERCIAL

## 2024-03-13 ENCOUNTER — OFFICE VISIT (OUTPATIENT)
Dept: PRIMARY CARE | Facility: CLINIC | Age: 44
End: 2024-03-13
Payer: COMMERCIAL

## 2024-03-13 VITALS
BODY MASS INDEX: 32.58 KG/M2 | HEART RATE: 84 BPM | SYSTOLIC BLOOD PRESSURE: 114 MMHG | WEIGHT: 190.8 LBS | OXYGEN SATURATION: 99 % | RESPIRATION RATE: 16 BRPM | DIASTOLIC BLOOD PRESSURE: 62 MMHG | HEIGHT: 64 IN

## 2024-03-13 DIAGNOSIS — R10.12 LEFT UPPER QUADRANT ABDOMINAL PAIN: ICD-10-CM

## 2024-03-13 DIAGNOSIS — R53.83 FATIGUE, UNSPECIFIED TYPE: ICD-10-CM

## 2024-03-13 DIAGNOSIS — I10 PRIMARY HYPERTENSION: ICD-10-CM

## 2024-03-13 DIAGNOSIS — I49.3 FREQUENT PVCS: Primary | ICD-10-CM

## 2024-03-13 DIAGNOSIS — L68.0 ANDROGEN-DEPENDENT HIRSUTISM: ICD-10-CM

## 2024-03-13 DIAGNOSIS — E66.09 CLASS 1 OBESITY DUE TO EXCESS CALORIES WITHOUT SERIOUS COMORBIDITY WITH BODY MASS INDEX (BMI) OF 31.0 TO 31.9 IN ADULT: ICD-10-CM

## 2024-03-13 DIAGNOSIS — E28.2 PCOS (POLYCYSTIC OVARIAN SYNDROME): ICD-10-CM

## 2024-03-13 DIAGNOSIS — E78.2 MIXED HYPERLIPIDEMIA: ICD-10-CM

## 2024-03-13 DIAGNOSIS — E55.9 VITAMIN D DEFICIENCY: ICD-10-CM

## 2024-03-13 DIAGNOSIS — L73.2 HIDRADENITIS SUPPURATIVA: ICD-10-CM

## 2024-03-13 PROBLEM — H93.8X2 ABNORMAL SENSATION IN LEFT EAR: Status: RESOLVED | Noted: 2024-03-13 | Resolved: 2024-03-13

## 2024-03-13 PROBLEM — N39.0 URINARY TRACT INFECTION: Status: RESOLVED | Noted: 2024-03-13 | Resolved: 2024-03-13

## 2024-03-13 PROBLEM — Z85.41 PERSONAL HISTORY OF MALIGNANT NEOPLASM OF CERVIX UTERI: Status: RESOLVED | Noted: 2018-12-29 | Resolved: 2024-03-13

## 2024-03-13 PROBLEM — L20.9 ATOPIC DERMATITIS: Status: RESOLVED | Noted: 2024-03-13 | Resolved: 2024-03-13

## 2024-03-13 PROBLEM — Z01.419 ENCNTR FOR GYN EXAM (GENERAL) (ROUTINE) W/O ABN FINDINGS: Status: RESOLVED | Noted: 2019-03-04 | Resolved: 2024-03-13

## 2024-03-13 PROBLEM — J01.11 ACUTE RECURRENT FRONTAL SINUSITIS: Status: RESOLVED | Noted: 2024-03-13 | Resolved: 2024-03-13

## 2024-03-13 PROBLEM — Z72.0 TOBACCO USE: Status: RESOLVED | Noted: 2018-12-29 | Resolved: 2024-03-13

## 2024-03-13 PROBLEM — I34.0 ACUTE MITRAL REGURGITATION: Status: RESOLVED | Noted: 2024-03-13 | Resolved: 2024-03-13

## 2024-03-13 PROBLEM — R07.9 CHEST PAIN: Status: RESOLVED | Noted: 2024-03-13 | Resolved: 2024-03-13

## 2024-03-13 PROCEDURE — 99214 OFFICE O/P EST MOD 30 MIN: CPT | Performed by: FAMILY MEDICINE

## 2024-03-13 RX ORDER — TIRZEPATIDE 2.5 MG/.5ML
2.5 INJECTION, SOLUTION SUBCUTANEOUS
Qty: 2 ML | Refills: 1 | Status: SHIPPED | OUTPATIENT
Start: 2024-03-13 | End: 2024-04-01 | Stop reason: ALTCHOICE

## 2024-03-13 RX ORDER — METOPROLOL SUCCINATE 25 MG/1
12.5 TABLET, EXTENDED RELEASE ORAL DAILY
Qty: 30 TABLET | Refills: 1 | Status: SHIPPED | OUTPATIENT
Start: 2024-03-13 | End: 2025-03-13

## 2024-03-13 NOTE — PROGRESS NOTES
Subjective   Patient ID: Sandy Nguyen is a 43 y.o. female who presents for Obesity.    HPI   Patient is following up on Obesity and weight loss.   She has not been on Adipex since December 2023.  She was 182 lb 12.8 oz at last weight in and is 190.8 lb  today. She has gained weight of 8 pounds. No weight loss today.    She is constipated with some small amount of bowel movements. She had some light diarrhea at times. Stomach has times not feeling good.     She has left upper abdominal pain at the diaphragm. She is having palpitations and fluttering in the chest recently. She had change of the cholesterol medication and Hypertension medication recently and this is when the changes started. Lisinopril was placed to 5 mg, atorvastatin 20 mg and Celexa to 20 mg. The left rib area ahs been painful/annoying for several months.    No refills are needed today.    Review of Systems  12 Systems have been reviewed as follows.  Constitutional: Fever, weight gain, weight loss, appetite change, night sweats, fatigue, chills.  Eyes : blurry, double vision, vision, loss, tearing, redness, pain, sensitivity to light, glaucoma.  Ears, nose, mouth, and throat: Hearing loss, ringing in the ears, ear pain, nasal congestion, nasal drainage, nosebleeds, mouth, throat, irritation tooth problem.  Cardiovascular :chest pain, pressure, heart racing, palpitations, sweating, leg swelling, high or low blood pressure  Pulmonary: Cough, yellow or green sputum, blood and sputum, shortness of breath, wheezing  Gastrointestinal: Nausea, vomiting, diarrhea, constipation, pain, blood in stool, or vomitus, heartburn, difficulty swallowing  Genitourinary: incontinence, abnormal bleeding, abnormal discharge, urinary frequency, urinary hesitancy, pain, impotence sexual problem, infection, urinary retention  Musculoskeletal: Pain, stiffness, joint, redness or warmth, arthritis, back pain, weakness, muscle wasting, sprain or fracture  Neuro: Weight  "weakness, dizziness, change in voice, change in taste change in vision, change in hearing, loss, or change of sensation, trouble walking, balance problems coordination problems, shaking, speech problem  Endocrine , cold or heat intolerance, blood sugar problem, weight gain or loss missed periods hot flashes, sweats, change in body hair, change in libido, increased thirst, increased urination  Heme/lymph: Swelling, bleeding, problem anemia, bruising, enlarged lymph nodes  Allergic/immunologic: H. plus nasal drip, watery itchy eyes, nasal drainage, immunosuppressed  The above were reviewed and noted negative except as noted in HPI and Problem List.      Objective   /62 (BP Location: Left arm, Patient Position: Sitting, BP Cuff Size: Adult)   Pulse 84   Resp 16   Ht 1.626 m (5' 4\")   Wt 86.5 kg (190 lb 12.8 oz)   SpO2 99%   BMI 32.75 kg/m²     Physical Exam  Constitutional: Well developed, well nourished, alert and in no acute distress   Eyes: Normal external exam. Pupils equally round and reactive to light with normal accommodation and extraocular movements intact.  Neck: Supple, no lymphadenopathy or masses.   Cardiovascular: Regular rate and rhythm, normal S1 and S2, no murmurs, gallops, or rubs. Radial pulses normal. No peripheral edema.  Pulmonary: No respiratory distress, lungs clear to auscultation bilaterally. No wheezes, rhonchi, rales.  Abdomen: LUQ tenderness, distended, without masses or HSM  Skin: Warm, well perfused, normal skin turgor and color.   Neurologic: Cranial nerves II-XII grossly intact.   Psychiatric: Mood calm and affect normal  Musculoskeletal: Moving all extremities without restriction    Assessment/Plan   Problem List Items Addressed This Visit             ICD-10-CM    Hypertension I10    Relevant Medications    tirzepatide (Mounjaro) 2.5 mg/0.5 mL pen injector    PCOS (polycystic ovarian syndrome) E28.2    Relevant Medications    tirzepatide (Mounjaro) 2.5 mg/0.5 mL pen " injector    Other Relevant Orders    Follow Up In [facility] - PCP - Established    Follow Up In [facility] - Pharmacy    Class 1 obesity due to excess calories without serious comorbidity with body mass index (BMI) of 31.0 to 31.9 in adult E66.09, Z68.31    Relevant Medications    tirzepatide (Mounjaro) 2.5 mg/0.5 mL pen injector    Other Relevant Orders    Follow Up [facility] - PCP - Established    Follow Up [facility] - Pharmacy    Hidradenitis suppurativa L73.2    Relevant Medications    tirzepatide (Mounjaro) 2.5 mg/0.5 mL pen injector     Other Visit Diagnoses         Codes    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    Relevant Medications    metoprolol succinate XL (Toprol-XL) 25 mg 24 hr tablet    Left upper quadrant abdominal pain     R10.12    Relevant Orders    CT abdomen pelvis wo IV contrast    Follow Up [facility] - PCP - Established    Follow Up [facility] - Pharmacy    Fatigue, unspecified type     R53.83    Relevant Orders    CBC and Auto Differential    Comprehensive Metabolic Panel    Lipid Panel    Thyroid Stimulating Hormone    Vitamin D 25-Hydroxy,Total (for eval of Vitamin D levels)    Follow Up [facility] - PCP - Established    Follow Up [facility] - Pharmacy    Vitamin D deficiency     E55.9    Relevant Orders    Vitamin D 25-Hydroxy,Total (for eval of Vitamin D levels)    Follow Up [facility] - PCP - Established    Follow Up [facility] - Pharmacy    BMI 32.0-32.9,adult     Z68.32    Relevant Medications    tirzepatide (Mounjaro) 2.5 mg/0.5 mL pen injector    Other Relevant Orders    Follow Up In [facility] - Pharmacy    Androgen-dependent hirsutism     L68.0    Relevant Medications    tirzepatide (Mounjaro) 2.5 mg/0.5 mL pen injector    Other Relevant Orders    Follow Up In [facility] - Pharmacy    Mixed hyperlipidemia     E78.2    Relevant  Medications    tirzepatide (Mounjaro) 2.5 mg/0.5 mL pen injector             Scribe Attestation  By signing my name below, I, Ruben Ruiz MD , Scribe   attest that this documentation has been prepared under the direction and in the presence of Ruben Ruiz MD.    Provider Attestation - Scribe documentation    All medical record entries made by the Scribe were at my direction and personally dictated by me. I have reviewed the chart and agree that the record accurately reflects my personal performance of the history, physical exam, discussion and plan.    Insurance will not cover any weight loss injectables.       Patient informed to check with insurance in regards to Qsymia.      Discuss humira for hidradenitis next     Check for pectoralis muscle strain next    Obtain ct scan of abdomen and pelvis     Blood work ordered     Start mounjaro     Restart adipex 1/2 qd

## 2024-03-19 ENCOUNTER — TELEPHONE (OUTPATIENT)
Dept: PRIMARY CARE | Facility: CLINIC | Age: 44
End: 2024-03-19
Payer: COMMERCIAL

## 2024-03-19 NOTE — TELEPHONE ENCOUNTER
[facility]     PT IS NEEDED FOR A PEER TO PEER, PT WAS DENIED FOR CT ABDOMEN AND PELVIS WITHOUT CONTRAST.       [facility] [phone] OPT[facility] 4     WE HAVE 3 DAYS TO HAVE P2P DONE

## 2024-03-22 ENCOUNTER — DOCUMENTATION (OUTPATIENT)
Dept: PRIMARY CARE | Facility: CLINIC | Age: 44
End: 2024-03-22
Payer: COMMERCIAL

## 2024-03-22 ENCOUNTER — TELEPHONE (OUTPATIENT)
Dept: PRIMARY CARE | Facility: CLINIC | Age: 44
End: 2024-03-22
Payer: COMMERCIAL

## 2024-03-22 DIAGNOSIS — R10.2 PELVIC PAIN: ICD-10-CM

## 2024-03-22 DIAGNOSIS — R10.84 GENERALIZED ABDOMINAL PAIN: ICD-10-CM

## 2024-03-22 DIAGNOSIS — F32.9 REACTIVE DEPRESSION: ICD-10-CM

## 2024-03-22 DIAGNOSIS — I10 PRIMARY HYPERTENSION: ICD-10-CM

## 2024-03-22 RX ORDER — CITALOPRAM 20 MG/1
20 TABLET, FILM COATED ORAL DAILY
Qty: 90 TABLET | Refills: 0 | Status: SHIPPED | OUTPATIENT
Start: 2024-03-22

## 2024-03-22 RX ORDER — LISINOPRIL 5 MG/1
5 TABLET ORAL DAILY
Qty: 90 TABLET | Refills: 0 | Status: SHIPPED | OUTPATIENT
Start: 2024-03-22

## 2024-03-22 NOTE — TELEPHONE ENCOUNTER
[facility]     CALLED PT TO R/S HER CT ABDOMEN/PELVIS W CONTRAST. PT DECIDED SHE WANTED TO HOLD OFF UNTIL SHE SEES [facility] ON HER NEXT OV B/C THINGS HAVE CHANGED.

## 2024-03-27 ENCOUNTER — LAB (OUTPATIENT)
Dept: LAB | Facility: LAB | Age: 44
End: 2024-03-27
Payer: COMMERCIAL

## 2024-03-27 ENCOUNTER — APPOINTMENT (OUTPATIENT)
Dept: RADIOLOGY | Facility: CLINIC | Age: 44
End: 2024-03-27
Payer: COMMERCIAL

## 2024-03-27 DIAGNOSIS — R53.83 FATIGUE, UNSPECIFIED TYPE: ICD-10-CM

## 2024-03-27 DIAGNOSIS — E66.09 CLASS 1 OBESITY DUE TO EXCESS CALORIES WITHOUT SERIOUS COMORBIDITY WITH BODY MASS INDEX (BMI) OF 31.0 TO 31.9 IN ADULT: ICD-10-CM

## 2024-03-27 DIAGNOSIS — E55.9 VITAMIN D DEFICIENCY: ICD-10-CM

## 2024-03-27 LAB
25(OH)D3 SERPL-MCNC: 35 NG/ML (ref 30–100)
ALBUMIN SERPL BCP-MCNC: 4.5 G/DL (ref 3.4–5)
ALP SERPL-CCNC: 93 U/L (ref 33–110)
ALT SERPL W P-5'-P-CCNC: 12 U/L (ref 7–45)
ANION GAP SERPL CALC-SCNC: 12 MMOL/L (ref 10–20)
AST SERPL W P-5'-P-CCNC: 15 U/L (ref 9–39)
BASOPHILS # BLD AUTO: 0.03 X10*3/UL (ref 0–0.1)
BASOPHILS NFR BLD AUTO: 0.4 %
BILIRUB SERPL-MCNC: 0.6 MG/DL (ref 0–1.2)
BUN SERPL-MCNC: 10 MG/DL (ref 6–23)
CALCIUM SERPL-MCNC: 9.2 MG/DL (ref 8.6–10.3)
CHLORIDE SERPL-SCNC: 104 MMOL/L (ref 98–107)
CHOLEST SERPL-MCNC: 141 MG/DL (ref 0–199)
CHOLESTEROL/HDL RATIO: 2.9
CO2 SERPL-SCNC: 27 MMOL/L (ref 21–32)
CREAT SERPL-MCNC: 0.68 MG/DL (ref 0.5–1.05)
EGFRCR SERPLBLD CKD-EPI 2021: >90 ML/MIN/1.73M*2
EOSINOPHIL # BLD AUTO: 0.06 X10*3/UL (ref 0–0.7)
EOSINOPHIL NFR BLD AUTO: 0.7 %
ERYTHROCYTE [DISTWIDTH] IN BLOOD BY AUTOMATED COUNT: 12.3 % (ref 11.5–14.5)
EST. AVERAGE GLUCOSE BLD GHB EST-MCNC: 100 MG/DL
GLUCOSE SERPL-MCNC: 84 MG/DL (ref 74–99)
HBA1C MFR BLD: 5.1 %
HCT VFR BLD AUTO: 44.9 % (ref 36–46)
HDLC SERPL-MCNC: 48 MG/DL
HGB BLD-MCNC: 15.3 G/DL (ref 12–16)
IMM GRANULOCYTES # BLD AUTO: 0.02 X10*3/UL (ref 0–0.7)
IMM GRANULOCYTES NFR BLD AUTO: 0.2 % (ref 0–0.9)
LDLC SERPL CALC-MCNC: 80 MG/DL
LYMPHOCYTES # BLD AUTO: 1.82 X10*3/UL (ref 1.2–4.8)
LYMPHOCYTES NFR BLD AUTO: 22.4 %
MCH RBC QN AUTO: 31.8 PG (ref 26–34)
MCHC RBC AUTO-ENTMCNC: 34.1 G/DL (ref 32–36)
MCV RBC AUTO: 93 FL (ref 80–100)
MONOCYTES # BLD AUTO: 0.61 X10*3/UL (ref 0.1–1)
MONOCYTES NFR BLD AUTO: 7.5 %
NEUTROPHILS # BLD AUTO: 5.58 X10*3/UL (ref 1.2–7.7)
NEUTROPHILS NFR BLD AUTO: 68.8 %
NON HDL CHOLESTEROL: 93 MG/DL (ref 0–149)
NRBC BLD-RTO: 0 /100 WBCS (ref 0–0)
PLATELET # BLD AUTO: 356 X10*3/UL (ref 150–450)
POTASSIUM SERPL-SCNC: 4.4 MMOL/L (ref 3.5–5.3)
PROT SERPL-MCNC: 6.9 G/DL (ref 6.4–8.2)
RBC # BLD AUTO: 4.81 X10*6/UL (ref 4–5.2)
SODIUM SERPL-SCNC: 139 MMOL/L (ref 136–145)
TRIGL SERPL-MCNC: 65 MG/DL (ref 0–149)
TSH SERPL-ACNC: 0.31 MIU/L (ref 0.44–3.98)
VLDL: 13 MG/DL (ref 0–40)
WBC # BLD AUTO: 8.1 X10*3/UL (ref 4.4–11.3)

## 2024-03-27 PROCEDURE — 83036 HEMOGLOBIN GLYCOSYLATED A1C: CPT

## 2024-03-27 PROCEDURE — 85025 COMPLETE CBC W/AUTO DIFF WBC: CPT

## 2024-03-27 PROCEDURE — 36415 COLL VENOUS BLD VENIPUNCTURE: CPT

## 2024-03-27 PROCEDURE — 82306 VITAMIN D 25 HYDROXY: CPT

## 2024-03-27 PROCEDURE — 84443 ASSAY THYROID STIM HORMONE: CPT

## 2024-03-27 PROCEDURE — 80061 LIPID PANEL: CPT

## 2024-03-27 PROCEDURE — 80053 COMPREHEN METABOLIC PANEL: CPT

## 2024-04-01 ENCOUNTER — TELEMEDICINE (OUTPATIENT)
Dept: PHARMACY | Facility: HOSPITAL | Age: 44
End: 2024-04-01
Payer: COMMERCIAL

## 2024-04-01 DIAGNOSIS — E55.9 VITAMIN D DEFICIENCY: ICD-10-CM

## 2024-04-01 DIAGNOSIS — R10.12 LEFT UPPER QUADRANT ABDOMINAL PAIN: ICD-10-CM

## 2024-04-01 DIAGNOSIS — E66.09 CLASS 1 OBESITY DUE TO EXCESS CALORIES WITHOUT SERIOUS COMORBIDITY WITH BODY MASS INDEX (BMI) OF 31.0 TO 31.9 IN ADULT: ICD-10-CM

## 2024-04-01 DIAGNOSIS — R53.83 FATIGUE, UNSPECIFIED TYPE: ICD-10-CM

## 2024-04-01 DIAGNOSIS — L68.0 ANDROGEN-DEPENDENT HIRSUTISM: ICD-10-CM

## 2024-04-01 DIAGNOSIS — E28.2 PCOS (POLYCYSTIC OVARIAN SYNDROME): ICD-10-CM

## 2024-04-01 RX ORDER — TIRZEPATIDE 5 MG/.5ML
5 INJECTION, SOLUTION SUBCUTANEOUS
Qty: 2 ML | Refills: 0 | Status: SHIPPED | OUTPATIENT
Start: 2024-04-01 | End: 2024-04-29 | Stop reason: SINTOL

## 2024-04-01 NOTE — PROGRESS NOTES
Pharmacist Clinic: Weight Management    Sandy Nguyen was referred to the Clinical Pharmacy Team for weight management.     Referring Provider: Ruben Ruiz MD    HISTORY OF PRESENT ILLNESS    Sandy Nguyen is a 42 year old female who was referred to the Clinical Pharmacy Team by Dr. Ruben Ruiz for weight management. Patients insurance does not cover weight loss medications, and requires a Type 2 Diabetes Diagnosis for other GLP1 medications. However, Mounjaro has a $1800 copay card that can be used by the patient until it runs out.     Patient was prescribed Mounjaro 2.5mg once weekly, and states she is tolerating the medication very well. The first 2 weeks she was slightly nauseous, however this week she did not experience any side effects. Patient states just being on the medication for 3 weeks she has already seen positive benefits. She feels better, has less unwanted hair growth from PCOS, and does not feel as bloated.    PHARMACY ASSESSMENT    Allergies: Penicillins     [id number] - [place], [place] - [address]  [address] [address]  Phone: [phone] Fax: [phone]    CURRENT PHARMACOTHERAPY  Mounjaro 2.5mg weekly (Thursday)    DRUG INTERACTIONS  No significant drug-drug interactions exist that require adjustment to therapy    LAB  Lab Results   Component Value Date    BILITOT 0.6 03/27/2024    CALCIUM 9.2 03/27/2024    CO2 27 03/27/2024     03/27/2024    CREATININE 0.68 03/27/2024    GLUCOSE 84 03/27/2024    ALKPHOS 93 03/27/2024    K 4.4 03/27/2024    PROT 6.9 03/27/2024     03/27/2024    AST 15 03/27/2024    ALT 12 03/27/2024    BUN 10 03/27/2024    ANIONGAP 12 03/27/2024    ALBUMIN 4.5 03/27/2024    EGFR >90 03/27/2024     Lab Results   Component Value Date    TRIG 65 03/27/2024    CHOL 141 03/27/2024    LDLCALC 80 03/27/2024    HDL 48.0 03/27/2024     Lab Results   Component Value Date    HGBA1C 5.1 03/27/2024       Current  Outpatient Medications on File Prior to Visit   Medication Sig Dispense Refill    atorvastatin (Lipitor) 20 mg tablet Take 1 tablet (20 mg) by mouth once daily. 90 tablet 1    citalopram (CeleXA) 20 mg tablet TAKE ONE TABLET BY MOUTH EVERY DAY 90 tablet 0    clindamycin (Cleocin T) 1 % lotion Apply topically 2 times a day.      lisinopril 5 mg tablet TAKE ONE TABLET BY MOUTH EVERY DAY 90 tablet 0    metoprolol succinate XL (Toprol-XL) 25 mg 24 hr tablet Take 0.5 tablets (12.5 mg) by mouth once daily. Do not crush or chew. 30 tablet 1    multivitamin with minerals (multivitamin-iron-folic acid) tablet Take 1 tablet by mouth once daily.      phentermine (Adipex-P) 37.5 mg tablet Take 1 tablet (37.5 mg) by mouth once daily in the morning. Take before meals. (Patient not taking: Reported on 3/13/2024) 30 tablet 0    triamcinolone (Kenalog) 0.1 % cream apply topically to hands 1 to 2 times daily as directed until resolved      vit C-Zn gluc-herbal no.325 (Elderberry Zinc Vit C) 90-15 mg lozenge Take by mouth.      [DISCONTINUED] tirzepatide (Mounjaro) 2.5 mg/0.5 mL pen injector Inject 2.5 mg under the skin 1 (one) time per week. 2 mL 1     No current facility-administered medications on file prior to visit.     PATIENT EDUCATION/GOALS  Counseled patient on MOA, expectations, side effects, duration of therapy, contraindications, administration, and monitoring parameters  Answered all patient questions and concerns; provided [facility] phone number if issues/questions arise    RECOMMENDATIONS/PLAN  CONTINUE Mounjaro 2.5mg for one more dose, then INCREASE to Mounjaro 5mg under the skin once weekly. Will send medication to patients preferred pharmacy.  Discussed with the patient that Mounjaro has been on backorder. If there is an issue receiving it at the pharmacy, call [facility].  Patients copay may go up to about $210/month, and then $550/month. Discussed this with the patient to make her aware.   Continue working on healthy diet and  lifestyle.    Follow up with the Clinical Pharmacy Team 4/29/2024 at 4:30PM    Continue all meds under the continuation of care with the referring provider and clinical pharmacy team.    Please reach out to the Clinical Pharmacy Team if there are any further questions.     Verbal consent to manage patient's drug therapy was obtained from patient. They were informed they may decline to participate or withdraw from participation in pharmacy services at any time.    Sultana Galeana, PharmD  [phone]

## 2024-04-10 ENCOUNTER — OFFICE VISIT (OUTPATIENT)
Dept: PRIMARY CARE | Facility: CLINIC | Age: 44
End: 2024-04-10
Payer: COMMERCIAL

## 2024-04-10 VITALS
BODY MASS INDEX: 31.07 KG/M2 | RESPIRATION RATE: 16 BRPM | DIASTOLIC BLOOD PRESSURE: 68 MMHG | HEIGHT: 64 IN | OXYGEN SATURATION: 98 % | HEART RATE: 83 BPM | SYSTOLIC BLOOD PRESSURE: 110 MMHG | WEIGHT: 182 LBS

## 2024-04-10 DIAGNOSIS — I10 PRIMARY HYPERTENSION: ICD-10-CM

## 2024-04-10 DIAGNOSIS — E28.2 PCOS (POLYCYSTIC OVARIAN SYNDROME): ICD-10-CM

## 2024-04-10 DIAGNOSIS — L73.2 HIDRADENITIS SUPPURATIVA: Primary | ICD-10-CM

## 2024-04-10 DIAGNOSIS — E66.09 CLASS 1 OBESITY DUE TO EXCESS CALORIES WITHOUT SERIOUS COMORBIDITY WITH BODY MASS INDEX (BMI) OF 31.0 TO 31.9 IN ADULT: ICD-10-CM

## 2024-04-10 DIAGNOSIS — E78.00 PURE HYPERCHOLESTEROLEMIA: ICD-10-CM

## 2024-04-10 DIAGNOSIS — R10.12 LEFT UPPER QUADRANT ABDOMINAL PAIN: ICD-10-CM

## 2024-04-10 DIAGNOSIS — R79.89 DECREASED THYROID STIMULATING HORMONE LEVEL: ICD-10-CM

## 2024-04-10 DIAGNOSIS — R53.83 FATIGUE, UNSPECIFIED TYPE: ICD-10-CM

## 2024-04-10 DIAGNOSIS — E55.9 VITAMIN D DEFICIENCY: ICD-10-CM

## 2024-04-10 DIAGNOSIS — L68.0 ANDROGEN-DEPENDENT HIRSUTISM: ICD-10-CM

## 2024-04-10 PROCEDURE — 99214 OFFICE O/P EST MOD 30 MIN: CPT | Performed by: FAMILY MEDICINE

## 2024-04-10 NOTE — PROGRESS NOTES
Subjective   Patient ID: Sandy Nguyen is a 43 y.o. female who presents for Weight Gain.    HPI   Patient is following up on weight gain. She is taking mounjaro 2.5 mg and will start the 5 mg dose tomorrow. She has lost 8 lbs but is still having increased fatigue. She is holding the adipex until she is unable to get the Mounjaro. She would like to continue with the injectable.   She does have some nausea right after the injection of mounjaro.     Patient had blood work on 3/27/24 to review today.     Review of Systems  12 Systems have been reviewed as follows.  Constitutional: Fever, weight gain, weight loss, appetite change, night sweats, fatigue, chills.  Eyes : blurry, double vision, vision, loss, tearing, redness, pain, sensitivity to light, glaucoma.  Ears, nose, mouth, and throat: Hearing loss, ringing in the ears, ear pain, nasal congestion, nasal drainage, nosebleeds, mouth, throat, irritation tooth problem.  Cardiovascular :chest pain, pressure, heart racing, palpitations, sweating, leg swelling, high or low blood pressure  Pulmonary: Cough, yellow or green sputum, blood and sputum, shortness of breath, wheezing  Gastrointestinal: Nausea, vomiting, diarrhea, constipation, pain, blood in stool, or vomitus, heartburn, difficulty swallowing  Genitourinary: incontinence, abnormal bleeding, abnormal discharge, urinary frequency, urinary hesitancy, pain, impotence sexual problem, infection, urinary retention  Musculoskeletal: Pain, stiffness, joint, redness or warmth, arthritis, back pain, weakness, muscle wasting, sprain or fracture  Neuro: Weight weakness, dizziness, change in voice, change in taste change in vision, change in hearing, loss, or change of sensation, trouble walking, balance problems coordination problems, shaking, speech problem  Endocrine , cold or heat intolerance, blood sugar problem, weight gain or loss missed periods hot flashes, sweats, change in body hair, change in libido, increased  "thirst, increased urination  Heme/lymph: Swelling, bleeding, problem anemia, bruising, enlarged lymph nodes  Allergic/immunologic: H. plus nasal drip, watery itchy eyes, nasal drainage, immunosuppressed  The above were reviewed and noted negative except as noted in HPI and Problem List.\    Objective   /68 (BP Location: Right arm, Patient Position: Sitting, BP Cuff Size: Adult)   Pulse 83   Resp 16   Ht 1.626 m (5' 4\")   Wt 82.6 kg (182 lb)   SpO2 98%   BMI 31.24 kg/m²     Physical Exam  Constitutional: Well developed, well nourished, alert and in no acute distress   Eyes: Normal external exam. Pupils equally round and reactive to light with normal accommodation and extraocular movements intact.  Neck: Supple, no lymphadenopathy or masses.   Cardiovascular: Regular rate and rhythm, normal S1 and S2, no murmurs, gallops, or rubs. Radial pulses normal. No peripheral edema.  Pulmonary: No respiratory distress, lungs clear to auscultation bilaterally. No wheezes, rhonchi, rales.  Abdomen: soft,non tender, non distended, without masses or HSM  Skin: Warm, well perfused, normal skin turgor and color.   Neurologic: Cranial nerves II-XII grossly intact.   Psychiatric: Mood calm and affect normal  Musculoskeletal: Moving all extremities without restriction    Assessment/Plan   Problem List Items Addressed This Visit             ICD-10-CM    Hypertension I10    PCOS (polycystic ovarian syndrome) E28.2    Relevant Orders    Follow Up In [facility] - PCP - Established    Pure hypercholesterolemia E78.00    Class 1 obesity due to excess calories without serious comorbidity with body mass index (BMI) of 31.0 to 31.9 in adult E66.09, Z68.31    Relevant Orders    Follow Up In [facility] - PCP - Established    Hidradenitis suppurativa - Primary L73.2    Androgen-dependent hirsutism L68.0     Other Visit Diagnoses         Codes    Left upper quadrant abdominal pain     R10.12    Relevant Orders    " Follow Up In [facility] - PCP - Established    Fatigue, unspecified type     R53.83    Relevant Orders    Follow Up In [facility] - PCP - Established    Vitamin D deficiency     E55.9    Relevant Orders    Follow Up In [facility] - PCP - Established             Insurance in past will not cover any weight loss injectables.       Patient informed to check with insurance in regards to Qsymia.      Discuss humira for hidradenitis next     Check for pectoralis muscle strain      Now resollved     Obtain ct scan of abdomen and pelvis      Continue mounjaro 5 mg     All  dx improved       Scribe Attestation  By signing my name below, I, Adamaris Ladd MA, Ricaibmelody   attest that this documentation has been prepared under the direction and in the presence of Ruben Ruiz MD.    Provider Attestation - Scribe documentation    All medical record entries made by the Scribe were at my direction and personally dictated by me. I have reviewed the chart and agree that the record accurately reflects my personal performance of the history, physical exam, discussion and plan.    Continue current medications and therapy for chronic medical conditions

## 2024-04-21 ENCOUNTER — HOSPITAL ENCOUNTER (EMERGENCY)
Facility: HOSPITAL | Age: 44
Discharge: HOME | End: 2024-04-21
Attending: EMERGENCY MEDICINE
Payer: COMMERCIAL

## 2024-04-21 ENCOUNTER — APPOINTMENT (OUTPATIENT)
Dept: CARDIOLOGY | Facility: HOSPITAL | Age: 44
End: 2024-04-21
Payer: COMMERCIAL

## 2024-04-21 ENCOUNTER — APPOINTMENT (OUTPATIENT)
Dept: RADIOLOGY | Facility: HOSPITAL | Age: 44
End: 2024-04-21
Payer: COMMERCIAL

## 2024-04-21 VITALS
TEMPERATURE: 97.5 F | WEIGHT: 180 LBS | BODY MASS INDEX: 30.73 KG/M2 | RESPIRATION RATE: 18 BRPM | DIASTOLIC BLOOD PRESSURE: 91 MMHG | SYSTOLIC BLOOD PRESSURE: 132 MMHG | HEART RATE: 102 BPM | HEIGHT: 64 IN | OXYGEN SATURATION: 99 %

## 2024-04-21 DIAGNOSIS — R11.2 NAUSEA AND VOMITING, UNSPECIFIED VOMITING TYPE: Primary | ICD-10-CM

## 2024-04-21 DIAGNOSIS — R11.15 CYCLIC VOMITING SYNDROME: ICD-10-CM

## 2024-04-21 DIAGNOSIS — T88.7XXA MEDICATION SIDE EFFECT: ICD-10-CM

## 2024-04-21 LAB
ALBUMIN SERPL BCP-MCNC: 4.9 G/DL (ref 3.4–5)
ALP SERPL-CCNC: 87 U/L (ref 33–110)
ALT SERPL W P-5'-P-CCNC: 19 U/L (ref 7–45)
ANION GAP SERPL CALC-SCNC: 20 MMOL/L (ref 10–20)
APPEARANCE UR: ABNORMAL
AST SERPL W P-5'-P-CCNC: 21 U/L (ref 9–39)
B-HCG SERPL-ACNC: <2 MIU/ML
BACTERIA #/AREA URNS AUTO: ABNORMAL /HPF
BASOPHILS # BLD AUTO: 0.01 X10*3/UL (ref 0–0.1)
BASOPHILS # BLD AUTO: 0.02 X10*3/UL (ref 0–0.1)
BASOPHILS NFR BLD AUTO: 0.1 %
BASOPHILS NFR BLD AUTO: 0.1 %
BILIRUB SERPL-MCNC: 0.8 MG/DL (ref 0–1.2)
BILIRUB UR STRIP.AUTO-MCNC: NEGATIVE MG/DL
BUN SERPL-MCNC: 15 MG/DL (ref 6–23)
CALCIUM SERPL-MCNC: 9.1 MG/DL (ref 8.6–10.3)
CARDIAC TROPONIN I PNL SERPL HS: 3 NG/L (ref 0–13)
CHLORIDE SERPL-SCNC: 102 MMOL/L (ref 98–107)
CO2 SERPL-SCNC: 21 MMOL/L (ref 21–32)
COLOR UR: YELLOW
CREAT SERPL-MCNC: 0.66 MG/DL (ref 0.5–1.05)
D DIMER PPP FEU-MCNC: 2180 NG/ML FEU
EGFRCR SERPLBLD CKD-EPI 2021: >90 ML/MIN/1.73M*2
EOSINOPHIL # BLD AUTO: 0 X10*3/UL (ref 0–0.7)
EOSINOPHIL # BLD AUTO: 0.01 X10*3/UL (ref 0–0.7)
EOSINOPHIL NFR BLD AUTO: 0 %
EOSINOPHIL NFR BLD AUTO: 0.1 %
ERYTHROCYTE [DISTWIDTH] IN BLOOD BY AUTOMATED COUNT: 11.9 % (ref 11.5–14.5)
ERYTHROCYTE [DISTWIDTH] IN BLOOD BY AUTOMATED COUNT: 12 % (ref 11.5–14.5)
GLUCOSE SERPL-MCNC: 142 MG/DL (ref 74–99)
GLUCOSE UR STRIP.AUTO-MCNC: NEGATIVE MG/DL
HCT VFR BLD AUTO: 40.3 % (ref 36–46)
HCT VFR BLD AUTO: 46.7 % (ref 36–46)
HGB BLD-MCNC: 14.2 G/DL (ref 12–16)
HGB BLD-MCNC: 16.2 G/DL (ref 12–16)
IMM GRANULOCYTES # BLD AUTO: 0.04 X10*3/UL (ref 0–0.7)
IMM GRANULOCYTES # BLD AUTO: 0.05 X10*3/UL (ref 0–0.7)
IMM GRANULOCYTES NFR BLD AUTO: 0.3 % (ref 0–0.9)
IMM GRANULOCYTES NFR BLD AUTO: 0.3 % (ref 0–0.9)
KETONES UR STRIP.AUTO-MCNC: ABNORMAL MG/DL
LACTATE SERPL-SCNC: 2 MMOL/L (ref 0.4–2)
LACTATE SERPL-SCNC: 2.7 MMOL/L (ref 0.4–2)
LEUKOCYTE ESTERASE UR QL STRIP.AUTO: NEGATIVE
LIPASE SERPL-CCNC: 58 U/L (ref 9–82)
LYMPHOCYTES # BLD AUTO: 0.69 X10*3/UL (ref 1.2–4.8)
LYMPHOCYTES # BLD AUTO: 0.71 X10*3/UL (ref 1.2–4.8)
LYMPHOCYTES NFR BLD AUTO: 4.6 %
LYMPHOCYTES NFR BLD AUTO: 5.9 %
MCH RBC QN AUTO: 31 PG (ref 26–34)
MCH RBC QN AUTO: 31.4 PG (ref 26–34)
MCHC RBC AUTO-ENTMCNC: 34.7 G/DL (ref 32–36)
MCHC RBC AUTO-ENTMCNC: 35.2 G/DL (ref 32–36)
MCV RBC AUTO: 89 FL (ref 80–100)
MCV RBC AUTO: 90 FL (ref 80–100)
MONOCYTES # BLD AUTO: 0.16 X10*3/UL (ref 0.1–1)
MONOCYTES # BLD AUTO: 0.19 X10*3/UL (ref 0.1–1)
MONOCYTES NFR BLD AUTO: 1.2 %
MONOCYTES NFR BLD AUTO: 1.4 %
MUCOUS THREADS #/AREA URNS AUTO: ABNORMAL /LPF
NEUTROPHILS # BLD AUTO: 10.75 X10*3/UL (ref 1.2–7.7)
NEUTROPHILS # BLD AUTO: 14.52 X10*3/UL (ref 1.2–7.7)
NEUTROPHILS NFR BLD AUTO: 92.2 %
NEUTROPHILS NFR BLD AUTO: 93.8 %
NITRITE UR QL STRIP.AUTO: NEGATIVE
NRBC BLD-RTO: 0 /100 WBCS (ref 0–0)
NRBC BLD-RTO: 0 /100 WBCS (ref 0–0)
PH UR STRIP.AUTO: 5 [PH]
PLATELET # BLD AUTO: 302 X10*3/UL (ref 150–450)
PLATELET # BLD AUTO: 342 X10*3/UL (ref 150–450)
POTASSIUM SERPL-SCNC: 3.7 MMOL/L (ref 3.5–5.3)
PROT SERPL-MCNC: 7.1 G/DL (ref 6.4–8.2)
PROT UR STRIP.AUTO-MCNC: ABNORMAL MG/DL
RBC # BLD AUTO: 4.52 X10*6/UL (ref 4–5.2)
RBC # BLD AUTO: 5.22 X10*6/UL (ref 4–5.2)
RBC # UR STRIP.AUTO: ABNORMAL /UL
RBC #/AREA URNS AUTO: ABNORMAL /HPF
SODIUM SERPL-SCNC: 139 MMOL/L (ref 136–145)
SP GR UR STRIP.AUTO: 1.02
SQUAMOUS #/AREA URNS AUTO: ABNORMAL /HPF
T4 FREE SERPL-MCNC: 1.5 NG/DL (ref 0.61–1.12)
TSH SERPL-ACNC: 0.3 MIU/L (ref 0.44–3.98)
UROBILINOGEN UR STRIP.AUTO-MCNC: <2 MG/DL
WBC # BLD AUTO: 11.7 X10*3/UL (ref 4.4–11.3)
WBC # BLD AUTO: 15.5 X10*3/UL (ref 4.4–11.3)
WBC #/AREA URNS AUTO: ABNORMAL /HPF

## 2024-04-21 PROCEDURE — 84484 ASSAY OF TROPONIN QUANT: CPT

## 2024-04-21 PROCEDURE — 36415 COLL VENOUS BLD VENIPUNCTURE: CPT

## 2024-04-21 PROCEDURE — 96376 TX/PRO/DX INJ SAME DRUG ADON: CPT | Mod: 59

## 2024-04-21 PROCEDURE — 2500000004 HC RX 250 GENERAL PHARMACY W/ HCPCS (ALT 636 FOR OP/ED): Performed by: EMERGENCY MEDICINE

## 2024-04-21 PROCEDURE — 84443 ASSAY THYROID STIM HORMONE: CPT | Performed by: EMERGENCY MEDICINE

## 2024-04-21 PROCEDURE — 83605 ASSAY OF LACTIC ACID: CPT | Performed by: EMERGENCY MEDICINE

## 2024-04-21 PROCEDURE — 83690 ASSAY OF LIPASE: CPT

## 2024-04-21 PROCEDURE — 71275 CT ANGIOGRAPHY CHEST: CPT

## 2024-04-21 PROCEDURE — 74177 CT ABD & PELVIS W/CONTRAST: CPT | Performed by: RADIOLOGY

## 2024-04-21 PROCEDURE — 2550000001 HC RX 255 CONTRASTS: Performed by: EMERGENCY MEDICINE

## 2024-04-21 PROCEDURE — 85025 COMPLETE CBC W/AUTO DIFF WBC: CPT | Performed by: EMERGENCY MEDICINE

## 2024-04-21 PROCEDURE — 93005 ELECTROCARDIOGRAM TRACING: CPT

## 2024-04-21 PROCEDURE — 96374 THER/PROPH/DIAG INJ IV PUSH: CPT | Mod: 59

## 2024-04-21 PROCEDURE — 74022 RADEX COMPL AQT ABD SERIES: CPT

## 2024-04-21 PROCEDURE — 36415 COLL VENOUS BLD VENIPUNCTURE: CPT | Performed by: EMERGENCY MEDICINE

## 2024-04-21 PROCEDURE — 2550000001 HC RX 255 CONTRASTS

## 2024-04-21 PROCEDURE — 85025 COMPLETE CBC W/AUTO DIFF WBC: CPT

## 2024-04-21 PROCEDURE — 81001 URINALYSIS AUTO W/SCOPE: CPT | Performed by: EMERGENCY MEDICINE

## 2024-04-21 PROCEDURE — 99285 EMERGENCY DEPT VISIT HI MDM: CPT | Mod: 25

## 2024-04-21 PROCEDURE — 74177 CT ABD & PELVIS W/CONTRAST: CPT

## 2024-04-21 PROCEDURE — 96361 HYDRATE IV INFUSION ADD-ON: CPT

## 2024-04-21 PROCEDURE — 84702 CHORIONIC GONADOTROPIN TEST: CPT | Performed by: EMERGENCY MEDICINE

## 2024-04-21 PROCEDURE — 71275 CT ANGIOGRAPHY CHEST: CPT | Performed by: RADIOLOGY

## 2024-04-21 PROCEDURE — 84075 ASSAY ALKALINE PHOSPHATASE: CPT | Performed by: EMERGENCY MEDICINE

## 2024-04-21 PROCEDURE — 99285 EMERGENCY DEPT VISIT HI MDM: CPT | Performed by: EMERGENCY MEDICINE

## 2024-04-21 PROCEDURE — 2500000001 HC RX 250 WO HCPCS SELF ADMINISTERED DRUGS (ALT 637 FOR MEDICARE OP): Performed by: EMERGENCY MEDICINE

## 2024-04-21 PROCEDURE — 85379 FIBRIN DEGRADATION QUANT: CPT | Performed by: EMERGENCY MEDICINE

## 2024-04-21 PROCEDURE — 2500000004 HC RX 250 GENERAL PHARMACY W/ HCPCS (ALT 636 FOR OP/ED)

## 2024-04-21 PROCEDURE — 96375 TX/PRO/DX INJ NEW DRUG ADDON: CPT | Mod: 59

## 2024-04-21 PROCEDURE — 84439 ASSAY OF FREE THYROXINE: CPT | Performed by: EMERGENCY MEDICINE

## 2024-04-21 PROCEDURE — 74022 RADEX COMPL AQT ABD SERIES: CPT | Performed by: RADIOLOGY

## 2024-04-21 RX ORDER — ONDANSETRON 4 MG/1
4 TABLET, ORALLY DISINTEGRATING ORAL EVERY 8 HOURS PRN
Qty: 20 TABLET | Refills: 0 | Status: SHIPPED | OUTPATIENT
Start: 2024-04-21 | End: 2024-04-28

## 2024-04-21 RX ORDER — ONDANSETRON HYDROCHLORIDE 2 MG/ML
4 INJECTION, SOLUTION INTRAVENOUS ONCE
Status: COMPLETED | OUTPATIENT
Start: 2024-04-21 | End: 2024-04-21

## 2024-04-21 RX ORDER — PROCHLORPERAZINE MALEATE 10 MG
10 TABLET ORAL EVERY 8 HOURS PRN
Qty: 15 TABLET | Refills: 0 | Status: SHIPPED | OUTPATIENT
Start: 2024-04-21 | End: 2024-04-26

## 2024-04-21 RX ORDER — LIDOCAINE HYDROCHLORIDE 20 MG/ML
15 SOLUTION OROPHARYNGEAL ONCE
Status: COMPLETED | OUTPATIENT
Start: 2024-04-21 | End: 2024-04-21

## 2024-04-21 RX ORDER — PROCHLORPERAZINE EDISYLATE 5 MG/ML
5 INJECTION INTRAMUSCULAR; INTRAVENOUS ONCE
Status: COMPLETED | OUTPATIENT
Start: 2024-04-21 | End: 2024-04-21

## 2024-04-21 RX ORDER — DROPERIDOL 2.5 MG/ML
0.62 INJECTION, SOLUTION INTRAMUSCULAR; INTRAVENOUS ONCE
Status: COMPLETED | OUTPATIENT
Start: 2024-04-21 | End: 2024-04-21

## 2024-04-21 RX ORDER — PROMETHAZINE HYDROCHLORIDE 25 MG/1
25 SUPPOSITORY RECTAL EVERY 6 HOURS PRN
Qty: 15 SUPPOSITORY | Refills: 0 | Status: SHIPPED | OUTPATIENT
Start: 2024-04-21

## 2024-04-21 RX ORDER — ALUMINUM HYDROXIDE, MAGNESIUM HYDROXIDE, AND SIMETHICONE 1200; 120; 1200 MG/30ML; MG/30ML; MG/30ML
30 SUSPENSION ORAL ONCE
Status: COMPLETED | OUTPATIENT
Start: 2024-04-21 | End: 2024-04-21

## 2024-04-21 RX ADMIN — DROPERIDOL 0.62 MG: 2.5 INJECTION, SOLUTION INTRAMUSCULAR; INTRAVENOUS at 11:44

## 2024-04-21 RX ADMIN — PROCHLORPERAZINE EDISYLATE 5 MG: 5 INJECTION INTRAMUSCULAR; INTRAVENOUS at 20:16

## 2024-04-21 RX ADMIN — SODIUM CHLORIDE, POTASSIUM CHLORIDE, SODIUM LACTATE AND CALCIUM CHLORIDE 1000 ML: 600; 310; 30; 20 INJECTION, SOLUTION INTRAVENOUS at 12:16

## 2024-04-21 RX ADMIN — IOHEXOL 75 ML: 350 INJECTION, SOLUTION INTRAVENOUS at 16:39

## 2024-04-21 RX ADMIN — SODIUM CHLORIDE, POTASSIUM CHLORIDE, SODIUM LACTATE AND CALCIUM CHLORIDE 1000 ML: 600; 310; 30; 20 INJECTION, SOLUTION INTRAVENOUS at 10:29

## 2024-04-21 RX ADMIN — PROCHLORPERAZINE EDISYLATE 5 MG: 5 INJECTION INTRAMUSCULAR; INTRAVENOUS at 14:52

## 2024-04-21 RX ADMIN — SODIUM CHLORIDE, POTASSIUM CHLORIDE, SODIUM LACTATE AND CALCIUM CHLORIDE 1000 ML: 600; 310; 30; 20 INJECTION, SOLUTION INTRAVENOUS at 14:52

## 2024-04-21 RX ADMIN — ONDANSETRON 4 MG: 2 INJECTION INTRAMUSCULAR; INTRAVENOUS at 10:24

## 2024-04-21 RX ADMIN — LIDOCAINE HYDROCHLORIDE 15 ML: 20 SOLUTION ORAL at 14:51

## 2024-04-21 RX ADMIN — ALUMINUM HYDROXIDE, MAGNESIUM HYDROXIDE, AND DIMETHICONE 30 ML: 200; 20; 200 SUSPENSION ORAL at 14:51

## 2024-04-21 RX ADMIN — IOHEXOL 60 ML: 350 INJECTION, SOLUTION INTRAVENOUS at 18:55

## 2024-04-21 ASSESSMENT — LIFESTYLE VARIABLES
EVER FELT BAD OR GUILTY ABOUT YOUR DRINKING: NO
TOTAL SCORE: 0
EVER HAD A DRINK FIRST THING IN THE MORNING TO STEADY YOUR NERVES TO GET RID OF A HANGOVER: NO
HAVE PEOPLE ANNOYED YOU BY CRITICIZING YOUR DRINKING: NO
HAVE YOU EVER FELT YOU SHOULD CUT DOWN ON YOUR DRINKING: NO

## 2024-04-21 ASSESSMENT — PAIN SCALES - GENERAL
PAINLEVEL_OUTOF10: 0 - NO PAIN
PAINLEVEL_OUTOF10: 0 - NO PAIN

## 2024-04-21 ASSESSMENT — COLUMBIA-SUICIDE SEVERITY RATING SCALE - C-SSRS
1. IN THE PAST MONTH, HAVE YOU WISHED YOU WERE DEAD OR WISHED YOU COULD GO TO SLEEP AND NOT WAKE UP?: NO
6. HAVE YOU EVER DONE ANYTHING, STARTED TO DO ANYTHING, OR PREPARED TO DO ANYTHING TO END YOUR LIFE?: NO
2. HAVE YOU ACTUALLY HAD ANY THOUGHTS OF KILLING YOURSELF?: NO

## 2024-04-21 ASSESSMENT — PAIN - FUNCTIONAL ASSESSMENT
PAIN_FUNCTIONAL_ASSESSMENT: 0-10
PAIN_FUNCTIONAL_ASSESSMENT: 0-10

## 2024-04-21 NOTE — ED PROVIDER NOTES
Patient received on handoff from the outgoing provider with CT angio of the chest pending given the elevated dimer and persistent tachycardia.  CT of the chest was negative for pulmonary embolism or acute intrathoracic pathology.  This was discussed with the patient at length.  It was revealed that patient had smoked 1 cigarette in the past 4 days.  We explained to her that the nicotine withdrawal may be contributing to her elevated heart rate.  That being said, patient felt comfortable being discharged home with a referral to cardiology.  Patient subsequently discharged home in satisfactory condition with a prescription for nausea medications.  All questions answered and return precautions discussed.     Willis Gtz MD  Resident  04/21/24 1959

## 2024-04-21 NOTE — ED PROVIDER NOTES
EMERGENCY DEPARTMENT ENCOUNTER      Pt Name: Sandy Nguyen  MRN: [id number]  Birthdate 1980  Date of evaluation: 4/21/2024  Provider: Geoff Jackson MD    CHIEF COMPLAINT       Chief Complaint   Patient presents with    Vomiting         HISTORY OF PRESENT ILLNESS    HPI A 44-year-old female with a significant past medical history of hypertension, hyperlipidemia, and anxiety presented to the hospital with intractable nausea and vomiting since last Thursday. She reported taking a Mounjaro shot, after which the symptoms began. She is currently on week 6 of the shot, and while she typically experiences brief nausea and vomiting, this episode is worse. She experienced continued dry heaving, feeling warm, and was unable to keep anything down. Additionally, she has a history of cyclic vomiting syndrome and occasionally smokes marijuana. She was unable to take her oral medications. She denied experiencing fever, chills, chest pain, palpitations, or changes in urinary or bowel habits. There was no history of recent infection or skin rashes.    Nursing Notes were reviewed.    PAST MEDICAL HISTORY     Past Medical History:   Diagnosis Date    Abnormal sensation in left ear 03/13/2024    Acute mitral regurgitation 03/13/2024    Acute recurrent frontal sinusitis 03/13/2024    Anesthesia of skin     Numbness    Atopic dermatitis 03/13/2024    Chest pain 03/13/2024    Encntr for gyn exam (general) (routine) w/o abn findings 03/04/2019    Personal history of cervical dysplasia 03/05/2020    History of cervical dysplasia    Personal history of diseases of the skin and subcutaneous tissue     History of chronic eczema    Personal history of malignant neoplasm of cervix uteri 12/29/2018    Personal history of other infectious and parasitic diseases     History of condyloma acuminatum    Tobacco use 12/29/2018    Urinary tract infection 03/13/2024         SURGICAL HISTORY       Past Surgical History:   Procedure Laterality Date     HYSTERECTOMY  03/05/2020    Hysterectomy    OTHER SURGICAL HISTORY  11/16/2022    Tubal ligation    OTHER SURGICAL HISTORY  04/22/2021    Hysterectomy    OTHER SURGICAL HISTORY  03/03/2020    Wrist fracture repair    TONSILLECTOMY  01/16/2017    Tonsillectomy         CURRENT MEDICATIONS       Previous Medications    ATORVASTATIN (LIPITOR) 20 MG TABLET    Take 1 tablet (20 mg) by mouth once daily.    CITALOPRAM (CELEXA) 20 MG TABLET    TAKE ONE TABLET BY MOUTH EVERY DAY    CLINDAMYCIN (CLEOCIN T) 1 % LOTION    Apply topically 2 times a day.    LISINOPRIL 5 MG TABLET    TAKE ONE TABLET BY MOUTH EVERY DAY    METOPROLOL SUCCINATE XL (TOPROL-XL) 25 MG 24 HR TABLET    Take 0.5 tablets (12.5 mg) by mouth once daily. Do not crush or chew.    MULTIVITAMIN WITH MINERALS (MULTIVITAMIN-IRON-FOLIC ACID) TABLET    Take 1 tablet by mouth once daily.    PHENTERMINE (ADIPEX-P) 37.5 MG TABLET    Take 1 tablet (37.5 mg) by mouth once daily in the morning. Take before meals.    TIRZEPATIDE (MOUNJARO) 5 MG/0.5 ML PEN INJECTOR    Inject 5 mg under the skin 1 (one) time per week.    TRIAMCINOLONE (KENALOG) 0.1 % CREAM    apply topically to hands 1 to 2 times daily as directed until resolved    VIT C-ZN GLUC-HERBAL NO.325 (ELDERBERRY ZINC VIT C) 90-15 MG LOZENGE    Take by mouth.       ALLERGIES     Penicillins    FAMILY HISTORY     No family history on file.       SOCIAL HISTORY       Social History     Socioeconomic History    Marital status:      Spouse name: None    Number of children: None    Years of education: None    Highest education level: None   Occupational History    None   Tobacco Use    Smoking status: Every Day     Current packs/day: 0.50     Types: Cigarettes    Smokeless tobacco: Never   Substance and Sexual Activity    Alcohol use: Never    Drug use: Yes     Types: Marijuana    Sexual activity: None   Other Topics Concern    None   Social History Narrative    None     Social Determinants of Health     Financial  Resource Strain: Not on file   Food Insecurity: Not on file   Transportation Needs: Not on file   Physical Activity: Not on file   Stress: Not on file   Social Connections: Not on file   Intimate Partner Violence: Not on file   Housing Stability: Not on file         PHYSICAL EXAM    (up to 7 for level 4, 8 or more for level 5)     ED Triage Vitals [04/21/24 0937]   Temperature Heart Rate Respirations BP   36.4 °C (97.5 °F) 72 19 148/66      Pulse Ox Temp Source Heart Rate Source Patient Position   100 % Temporal Monitor Sitting      BP Location FiO2 (%)     Right arm --       Physical Exam  Vitals and nursing note reviewed.   Constitutional:       General: She is not in acute distress.     Appearance: She is well-developed and normal weight. She is ill-appearing.   HENT:      Head: Normocephalic and atraumatic.   Eyes:      Conjunctiva/sclera: Conjunctivae normal.   Cardiovascular:      Rate and Rhythm: Regular rhythm. Tachycardia present.      Pulses: Normal pulses.      Heart sounds: No murmur heard.  Pulmonary:      Effort: Pulmonary effort is normal. No respiratory distress.      Breath sounds: Normal breath sounds.   Abdominal:      Palpations: Abdomen is soft.      Tenderness: There is no abdominal tenderness.   Musculoskeletal:         General: No swelling.      Cervical back: Neck supple.   Skin:     General: Skin is warm and dry.      Capillary Refill: Capillary refill takes less than 2 seconds.   Neurological:      General: No focal deficit present.      Mental Status: She is alert and oriented to person, place, and time. Mental status is at baseline.   Psychiatric:         Mood and Affect: Mood normal.        DIAGNOSTIC RESULTS     LABS:  Labs Reviewed   URINALYSIS WITH REFLEX MICROSCOPIC - Abnormal       Result Value    Color, Urine Yellow      Appearance, Urine Hazy (*)     Specific Gravity, Urine 1.023      pH, Urine 5.0      Protein, Urine 30 (1+) (*)     Glucose, Urine NEGATIVE      Blood, Urine  SMALL (1+) (*)     Ketones, Urine 80 (2+) (*)     Bilirubin, Urine NEGATIVE      Urobilinogen, Urine <2.0      Nitrite, Urine NEGATIVE      Leukocyte Esterase, Urine NEGATIVE     COMPREHENSIVE METABOLIC PANEL - Abnormal    Glucose 142 (*)     Sodium 139      Potassium 3.7      Chloride 102      Bicarbonate 21      Anion Gap 20      Urea Nitrogen 15      Creatinine 0.66      eGFR >90      Calcium 9.1      Albumin 4.9      Alkaline Phosphatase 87      Total Protein 7.1      AST 21      Bilirubin, Total 0.8      ALT 19     CBC WITH AUTO DIFFERENTIAL - Abnormal    WBC 15.5 (*)     nRBC 0.0      RBC 5.22 (*)     Hemoglobin 16.2 (*)     Hematocrit 46.7 (*)     MCV 90      MCH 31.0      MCHC 34.7      RDW 12.0      Platelets 342      Neutrophils % 93.8      Immature Granulocytes %, Automated 0.3      Lymphocytes % 4.6      Monocytes % 1.2      Eosinophils % 0.0      Basophils % 0.1      Neutrophils Absolute 14.52 (*)     Immature Granulocytes Absolute, Automated 0.05      Lymphocytes Absolute 0.71 (*)     Monocytes Absolute 0.19      Eosinophils Absolute 0.00      Basophils Absolute 0.02     LACTATE - Abnormal    Lactate 2.7 (*)     Narrative:     Venipuncture immediately after or during the administration of Metamizole may lead to falsely low results. Testing should be performed immediately  prior to Metamizole dosing.   MICROSCOPIC ONLY, URINE - Abnormal    WBC, Urine 1-5      RBC, Urine 6-10 (*)     Squamous Epithelial Cells, Urine 1-9 (SPARSE)      Bacteria, Urine 1+ (*)     Mucus, Urine 2+     HUMAN CHORIONIC GONADOTROPIN, SERUM QUANTITATIVE - Normal    HCG, Beta-Quantitative <2      Narrative:      Total HCG measurement is performed using the [employer] Access   Immunoassay which detects intact HCG and free beta HCG subunit.    This test is not indicated for use as a tumor marker.   HCG testing is performed using a different test methodology at [facility] than other [facility]. Direct  result comparison   should only be made within the same method.       LIPASE - Normal    Lipase 58      Narrative:     Venipuncture immediately after or during the administration of Metamizole may lead to falsely low results. Testing should be performed immediately prior to Metamizole dosing.   TROPONIN I, HIGH SENSITIVITY - Normal    Troponin I, High Sensitivity 3      Narrative:     Less than 99th percentile of normal range cutoff-  Female and children under 18 years old <14 ng/L; Male <21 ng/L: Negative  Repeat testing should be performed if clinically indicated.     Female and children under 18 years old 14-50 ng/L; Male 21-50 ng/L:  Consistent with possible cardiac damage and possible increased clinical   risk. Serial measurements may help to assess extent of myocardial damage.     >50 ng/L: Consistent with cardiac damage, increased clinical risk and  myocardial infarction. Serial measurements may help assess extent of   myocardial damage.      NOTE: Children less than 1 year old may have higher baseline troponin   levels and results should be interpreted in conjunction with the overall   clinical context.     NOTE: Troponin I testing is performed using a different   testing methodology at [facility] than at other   [facility]. Direct result comparisons should only   be made within the same method.   LACTATE   URINE GRAY TUBE       All other labs were within normal range or not returned as of this dictation.    Imaging  XR abdomen 2 views w chest 1 view   Final Result   1.  Nonobstructive bowel gas pattern.   2.  No evidence of acute cardiopulmonary process.             MACRO:   None        Signed by: Steven Villasenor 4/21/2024 11:19 AM   Dictation workstation:   [id number]           Procedures  Procedures     EMERGENCY DEPARTMENT COURSE/MDM:   Sandy Nguyen is a 43 y.o. female presenting to the ED for evaluation of had concerns including Vomiting..     Medical Decision Making  Initial  vitals were notable for a blood pressure of 148/66 and a heart rate of 117. The EKG revealed sinus tachycardia with a ventricular rate of 108 and a QTc of 447, without ischemic changes. Further workup showed a CBC demonstrating leukocytosis (15.5) and hemoconcentration (hemoglobin 16.2, HCT 46.7). The CMP revealed hyponatremia (142) and a lactate level of 2.7. Urinalysis indicated positive proteinuria, +1 blood, and +2 ketonuria. Imaging showed a nonobstructive bowel gas pattern and no evidence of acute cardiopulmonary processes.  Further assessment revealed a heart rate of 110-120, and a D-dimer test was added to rule out pulmonary embolism,in addition to a CBC to monitor for leukocytosis after rehydration.The patient was deemed medically stable for discharge with a plan for follow-up with her primary care provider. She was prescribed as-needed Zofran and Phenergan for nausea and vomiting.    Medical Decision Making  =================Attending note===============    The patient was seen by the resident/fellow.  I have personally performed a substantive portion of the encounter.  I have seen and examined the patient; agree with the workup, evaluation, MDM,   management and diagnosis.  The care plan has been discussed with the resident; I have reviewed the resident's note and agree with the documented findings.      This is a 43 y.o. female who presents to ER with nausea and vomiting.  She has had this for 3 days.  She vomited 3 times on Friday, none yesterday, 3 times today.  Has been having persistent dry heaves throughout the entire day today.  No diarrhea.  No fevers.  No urinary issues.  No sick contacts.  She does have some mild generalized abdominal discomfort.  She does have a history of hypertension and hyperlipidemia and anxiety.  She did start taking Mounjaro.  She took 2.5 mg for 4 weeks and Thursday was her second injection of 5 mg.  She has some GI upset last week, but nothing like this.    Heart is  regular.  Lungs are clear.  There is some mild generalized tenderness without any focal tenderness.  No guarding or rebound.  No peritoneal signs.  She has a nonsurgical abdomen.  Bowel sounds are slightly hypoactive.    We will get an x-ray to make sure there is no obstructive process.    The symptoms could be related to her Mounjaro injection.  Will make sure there is nothing else causing this.  She is given antiemetics and fluids.  Will check lab work.            ==========================================        ED Medications administered this visit:    Medications   ondansetron (Zofran) injection 4 mg (4 mg intravenous Given 4/21/24 1024)   lactated Ringer's bolus 1,000 mL (0 mL intravenous Stopped 4/21/24 1229)   droperidol (Inapsine) injection 0.625 mg (0.625 mg intravenous Given 4/21/24 1144)   lactated Ringer's bolus 1,000 mL (0 mL intravenous Stopped 4/21/24 1316)       New Prescriptions from this visit:    New Prescriptions    No medications on file       Final Impression:   1. Nausea and vomiting, unspecified vomiting type    2. Cyclic vomiting syndrome    3. Medication side effect          (Please note that portions of this note were completed with a voice recognition program.  Efforts were made to edit the dictations but occasionally words are mis-transcribed.)       Geoff Jackson MD  Resident  04/21/24 7004

## 2024-04-21 NOTE — DISCHARGE INSTRUCTIONS
Patients GI provider:  Dr. Chapin    Number to return call: ([phone]     Reason for call: Pt calling regarding a call she got today. Pt stated that she don't remember the name of the person that called, but the call was regarding a recent cologuard test she did. Pt would like to speak to some regarding this matter.    Scheduled procedure/appointment date if applicable:   01/02/24     Please call and follow up with your primary care provider (PCP) for follow-up ED course.     Seek immediate medical attention if you develop:  worsening abdominal pain, new or worsening nausea, new or worsening vomiting, new or worsening diarrhea, chest pain, shortness of breath, pain with urination, problems urinating, fever, chills, weakness, or any new or worsening symptoms.

## 2024-04-22 LAB — HOLD SPECIMEN: NORMAL

## 2024-04-22 NOTE — PROGRESS NOTES
Sandy Nguyen is a 43 y.o. female on day 0 of admission presenting with No Principal Problem: There is no principal problem currently on the Problem List. Please update the Problem List and refresh..    Patient was signed out to me Dr. Jara.  Reviewing the chart and talking with the patient, she elevated her dose of Mounjaro and then immediately began having nausea and vomiting.  She feels much better now after the second dose of nausea medicine which was Compazine.  I notified the patient that her heart rate is elevated here in the emergency room, and an EKG does appear to be sinus tachycardia.  She was as low as 105 bpm and as high as 128.  I asked her if there are any changes in her recent medications and of the Mounjaro, nothing is really changed other than she stopped smoking about 3 days ago.  She states she smoked 1 cigarette in the past 3 days maybe 2 and she is really craving 1.  At this time I notified the patient that we will be working on for inappropriate tachycardia, and add a D-dimer for clotting to make sure that this is not a pulmonary embolism issue.  The D-dimer test did come back elevated, therefore CT scan of the chest was recommended.    CT scan of the chest revealed no sign of cardiomegaly, pericardial effusion, pleural effusion, or pulmonary embolism.  I do long courses with the patient, and notified about the tachycardia and she states she feels condyle being discharged home and does not need to be admitted to the hospital.  She feels under percent comfortable follow-up with cardiology as an outpatient this week and coming back to the hospital if the tachycardia worsens, she gets palpitations or chest pain or any shortness of breath or dizziness.  I notified her through shared decision that that seem to be an appropriate alternative to being admitted therefore she will be discharged from the emergency room.  I also notified her that sometimes nicotine withdrawal from stopping smoking  can cause tachycardia as a vascular check her heart rate at home and again if the heart rate continues to be elevated or goes above 120 bpm she should return to the emergency room for reevaluation and keep the cardiology appointment.  She agrees and again will see the cardiologist outpatient.      Yao Bernardo DO

## 2024-04-29 ENCOUNTER — TELEMEDICINE (OUTPATIENT)
Dept: PHARMACY | Facility: HOSPITAL | Age: 44
End: 2024-04-29
Payer: COMMERCIAL

## 2024-04-29 DIAGNOSIS — E28.2 PCOS (POLYCYSTIC OVARIAN SYNDROME): ICD-10-CM

## 2024-04-29 DIAGNOSIS — E66.09 CLASS 1 OBESITY DUE TO EXCESS CALORIES WITHOUT SERIOUS COMORBIDITY WITH BODY MASS INDEX (BMI) OF 31.0 TO 31.9 IN ADULT: ICD-10-CM

## 2024-04-29 LAB
ATRIAL RATE: 108 BPM
ATRIAL RATE: 112 BPM
P AXIS: 74 DEGREES
P AXIS: 83 DEGREES
P OFFSET: 189 MS
P OFFSET: 193 MS
P ONSET: 132 MS
P ONSET: 134 MS
PR INTERVAL: 180 MS
PR INTERVAL: 180 MS
Q ONSET: 222 MS
Q ONSET: 224 MS
QRS COUNT: 18 BEATS
QRS COUNT: 18 BEATS
QRS DURATION: 70 MS
QRS DURATION: 84 MS
QT INTERVAL: 324 MS
QT INTERVAL: 334 MS
QTC CALCULATION(BAZETT): 442 MS
QTC CALCULATION(BAZETT): 447 MS
QTC FREDERICIA: 399 MS
QTC FREDERICIA: 406 MS
R AXIS: 80 DEGREES
R AXIS: 81 DEGREES
T AXIS: 54 DEGREES
T AXIS: 77 DEGREES
T OFFSET: 386 MS
T OFFSET: 389 MS
VENTRICULAR RATE: 108 BPM
VENTRICULAR RATE: 112 BPM

## 2024-04-29 RX ORDER — TIRZEPATIDE 2.5 MG/.5ML
2.5 INJECTION, SOLUTION SUBCUTANEOUS
Qty: 2 ML | Refills: 1 | Status: SHIPPED | OUTPATIENT
Start: 2024-05-05 | End: 2024-05-10 | Stop reason: SDUPTHER

## 2024-04-29 NOTE — PROGRESS NOTES
Pharmacist Clinic: Weight Management    Sandy Nguyen was referred to the Clinical Pharmacy Team for weight management.     Referring Provider: Ruben Ruiz MD    HISTORY OF PRESENT ILLNESS    Sandy Nguyen is a 42 year old female who was referred to the Clinical Pharmacy Team by Dr. Ruben Ruiz for weight management. Patients insurance does not cover weight loss medications, and requires a Type 2 Diabetes Diagnosis for other GLP1 medications. However, Mounjaro has a $1800 copay card that can be used by the patient until it runs out.     Patient was prescribed Mounjaro 2.5mg once weekly, and states she was tolerating the medication very well. The first 2 weeks she was slightly nauseous, however this week she did not experience any side effects. Patient was increased to 5mg and states she was okay with the first dose, however after the second dose she was not feeling well. Patient took the medication on Wednesday, then Thursday morning she wasn't feeling well and Friday she threw up 3.5 times (dry heaving due to not having anything left to throw up). Patient states she felt a little better on Saturday, but Sunday started feeling bad again so went to the ED. They gave her fluids in the ED, her heart rate was increased but they believe this was due to nicotine withdrawal. Patient has not had a dose of Mounjaro since, which is about 2 weeks.    Patient states being on the medication, she has already seen positive results. Patient states she has lost at least 8lbs prior to not feeling well and states she has less unwanted hair growth due to her PCOS and does not feel as bloated when on the medication.      PHARMACY ASSESSMENT    Allergies: Penicillins     [id number] - [place], [place] - [address]  [address] [address]  Phone: [phone] Fax: [phone]    CURRENT PHARMACOTHERAPY  N/a    DRUG INTERACTIONS  No significant drug-drug interactions exist that require  adjustment to therapy    LAB  Lab Results   Component Value Date    BILITOT 0.8 2024    CALCIUM 9.1 2024    CO2 21 2024     2024    CREATININE 0.66 2024    GLUCOSE 142 (H) 2024    ALKPHOS 87 2024    K 3.7 2024    PROT 7.1 2024     2024    AST 21 2024    ALT 19 2024    BUN 15 2024    ANIONGAP 20 2024    ALBUMIN 4.9 2024    LIPASE 58 2024    EGFR >90 2024     Lab Results   Component Value Date    TRIG 65 2024    CHOL 141 2024    LDLCALC 80 2024    HDL 48.0 2024     Lab Results   Component Value Date    HGBA1C 5.1 2024       Current Outpatient Medications on File Prior to Visit   Medication Sig Dispense Refill    atorvastatin (Lipitor) 20 mg tablet Take 1 tablet (20 mg) by mouth once daily. 90 tablet 1    citalopram (CeleXA) 20 mg tablet TAKE ONE TABLET BY MOUTH EVERY DAY 90 tablet 0    clindamycin (Cleocin T) 1 % lotion Apply topically 2 times a day.      lisinopril 5 mg tablet TAKE ONE TABLET BY MOUTH EVERY DAY 90 tablet 0    metoprolol succinate XL (Toprol-XL) 25 mg 24 hr tablet Take 0.5 tablets (12.5 mg) by mouth once daily. Do not crush or chew. 30 tablet 1    multivitamin with minerals (multivitamin-iron-folic acid) tablet Take 1 tablet by mouth once daily.      [] ondansetron ODT (Zofran-ODT) 4 mg disintegrating tablet Take 1 tablet (4 mg) by mouth every 8 hours if needed for nausea or vomiting for up to 7 days. 20 tablet 0    phentermine (Adipex-P) 37.5 mg tablet Take 1 tablet (37.5 mg) by mouth once daily in the morning. Take before meals. (Patient not taking: Reported on 3/13/2024) 30 tablet 0    [] prochlorperazine (Compazine) 10 mg tablet Take 1 tablet (10 mg) by mouth every 8 hours if needed for nausea or vomiting for up to 5 days. 15 tablet 0    promethazine (Phenergan) 25 mg suppository Insert 1 suppository (25 mg) into the rectum every 6 hours if  needed for nausea or vomiting. 15 suppository 0    triamcinolone (Kenalog) 0.1 % cream apply topically to hands 1 to 2 times daily as directed until resolved      vit C-Zn gluc-herbal no.325 (Elderberry Zinc Vit C) 90-15 mg lozenge Take by mouth.      [DISCONTINUED] tirzepatide (Mounjaro) 5 mg/0.5 mL pen injector Inject 5 mg under the skin 1 (one) time per week. 2 mL 0     No current facility-administered medications on file prior to visit.     PATIENT EDUCATION/GOALS  Mounjaro Education:   Counseled patient on MOA, expectations, side effects, duration of therapy, contraindications, administration, and monitoring parameters  Provided detailed dosing and administration counseling to ensure proper technique.   Reviewed Mounjaro titration schedule, starting with 2.5 mg once weekly to a goal of 15 mg once weekly if tolerated  Counseled patient on the benefits of GLP-1ra glycemic control and weight loss  Reviewed storage requirements of Mounjaro when not in use, and when to administer the medication if a dose is missed.  Advised patient that they may experience improved satiety after meals and portion sizes of meals may be reduced as doses of Mounjaro increase.  Discussed with patient the off-label use of once-weekly Mounjaro  which is indicated and approved by the FDA for Type Two Diabetes Mellitus treatment. Medication cost and coverage for this agent may change at any time as determined by your primary insurance carrier.   Answered all patient questions and concerns; provided [facility] phone number if issues/questions arise    RECOMMENDATIONS/PLAN  RESTART Mounjaro 2.5mg under the skin once weekly, once feeling back to normal. Will send medication to preferred pharmacy.  Discussed with the patient that she can start taking it about every 10 days if she is feeling nausea with the first dose, and then decrease to every 9 days, 8 days, then 7 days until feeling stable with the dose.  Patient would like to restart medication  due to experiencing good results when on the 2.5mg dose. Educated patient to stay hydrated while taking medication.  Continue working on healthy diet and lifestyle.    Follow up with the Clinical Pharmacy Team 5/20/2024 at 4:30PM    Continue all meds under the continuation of care with the referring provider and clinical pharmacy team.    Please reach out to the Clinical Pharmacy Team if there are any further questions.     Verbal consent to manage patient's drug therapy was obtained from patient. They were informed they may decline to participate or withdraw from participation in pharmacy services at any time.    Sultana Galeana, PharmD  [phone]

## 2024-05-08 ENCOUNTER — OFFICE VISIT (OUTPATIENT)
Dept: PRIMARY CARE | Facility: CLINIC | Age: 44
End: 2024-05-08
Payer: COMMERCIAL

## 2024-05-08 VITALS
HEART RATE: 59 BPM | WEIGHT: 176 LBS | BODY MASS INDEX: 30.05 KG/M2 | RESPIRATION RATE: 16 BRPM | OXYGEN SATURATION: 100 % | SYSTOLIC BLOOD PRESSURE: 132 MMHG | HEIGHT: 64 IN | DIASTOLIC BLOOD PRESSURE: 84 MMHG

## 2024-05-08 DIAGNOSIS — E55.9 VITAMIN D DEFICIENCY: ICD-10-CM

## 2024-05-08 DIAGNOSIS — I10 PRIMARY HYPERTENSION: ICD-10-CM

## 2024-05-08 DIAGNOSIS — L30.9 ECZEMA, UNSPECIFIED TYPE: ICD-10-CM

## 2024-05-08 DIAGNOSIS — E66.09 CLASS 1 OBESITY DUE TO EXCESS CALORIES WITHOUT SERIOUS COMORBIDITY WITH BODY MASS INDEX (BMI) OF 31.0 TO 31.9 IN ADULT: ICD-10-CM

## 2024-05-08 DIAGNOSIS — E28.2 PCOS (POLYCYSTIC OVARIAN SYNDROME): ICD-10-CM

## 2024-05-08 DIAGNOSIS — R10.12 LEFT UPPER QUADRANT ABDOMINAL PAIN: ICD-10-CM

## 2024-05-08 DIAGNOSIS — E53.8 VITAMIN B 12 DEFICIENCY: ICD-10-CM

## 2024-05-08 DIAGNOSIS — M19.90 OSTEOARTHRITIS, UNSPECIFIED OSTEOARTHRITIS TYPE, UNSPECIFIED SITE: ICD-10-CM

## 2024-05-08 DIAGNOSIS — R53.83 FATIGUE, UNSPECIFIED TYPE: ICD-10-CM

## 2024-05-08 DIAGNOSIS — E78.00 PURE HYPERCHOLESTEROLEMIA: ICD-10-CM

## 2024-05-08 DIAGNOSIS — E78.41 ELEVATED LIPOPROTEIN(A): ICD-10-CM

## 2024-05-08 PROCEDURE — 99214 OFFICE O/P EST MOD 30 MIN: CPT | Performed by: FAMILY MEDICINE

## 2024-05-08 NOTE — PROGRESS NOTES
Subjective   Patient ID: Sandy Nguyen is a 43 y.o. female who presents for Hidradenitis Suppurativa and Weight Gain.    HPI   Patient is following up on weight gain. She is taking mounjaro 2.5 mg and will start the 5 mg dose tomorrow. She took the 5 mg and two days later had to go to the ER [facility] on 4/21/24 for nausea and vomiting. She had blood work, CT angio chest, CT abdomen pelvis, given fluids, Xray, EKG, given lidocaine nausea medication. She did not eat for 4 days. She was sent home with phenergan, Compazine and zofran but have not used.     She was told to try the 2.5 mg dose again  but is not available currently at the pharmacy.  She has lost 4 lbs. She does have some nausea right after the injection of mounjaro.      She would like to review the blood work and CT results due to diagnosis given about lacerated liver.       Review of Systems  12 Systems have been reviewed as follows.  Constitutional: Fever, weight gain, weight loss, appetite change, night sweats, fatigue, chills.  Eyes : blurry, double vision, vision, loss, tearing, redness, pain, sensitivity to light, glaucoma.  Ears, nose, mouth, and throat: Hearing loss, ringing in the ears, ear pain, nasal congestion, nasal drainage, nosebleeds, mouth, throat, irritation tooth problem.  Cardiovascular :chest pain, pressure, heart racing, palpitations, sweating, leg swelling, high or low blood pressure  Pulmonary: Cough, yellow or green sputum, blood and sputum, shortness of breath, wheezing  Gastrointestinal: Nausea, vomiting, diarrhea, constipation, pain, blood in stool, or vomitus, heartburn, difficulty swallowing  Genitourinary: incontinence, abnormal bleeding, abnormal discharge, urinary frequency, urinary hesitancy, pain, impotence sexual problem, infection, urinary retention  Musculoskeletal: Pain, stiffness, joint, redness or warmth, arthritis, back pain, weakness, muscle wasting, sprain or fracture  Neuro: Weight weakness, dizziness, change  "in voice, change in taste change in vision, change in hearing, loss, or change of sensation, trouble walking, balance problems coordination problems, shaking, speech problem  Endocrine , cold or heat intolerance, blood sugar problem, weight gain or loss missed periods hot flashes, sweats, change in body hair, change in libido, increased thirst, increased urination  Heme/lymph: Swelling, bleeding, problem anemia, bruising, enlarged lymph nodes  Allergic/immunologic: H. plus nasal drip, watery itchy eyes, nasal drainage, immunosuppressed  The above were reviewed and noted negative except as noted in HPI and Problem List.      Objective   /84 (BP Location: Left arm, Patient Position: Sitting, BP Cuff Size: Adult)   Pulse 59   Resp 16   Ht 1.626 m (5' 4\")   Wt 79.8 kg (176 lb)   SpO2 100%   BMI 30.21 kg/m²     Physical Exam  Constitutional: Well developed, well nourished, alert and in no acute distress   Eyes: Normal external exam. Pupils equally round and reactive to light with normal accommodation and extraocular movements intact.  Neck: Supple, no lymphadenopathy or masses.   Cardiovascular: Regular rate and rhythm, normal S1 and S2, no murmurs, gallops, or rubs. Radial pulses normal. No peripheral edema.  Pulmonary: No respiratory distress, lungs clear to auscultation bilaterally. No wheezes, rhonchi, rales.  Abdomen: soft,non tender, non distended, without masses or HSM  Skin: Warm, well perfused, normal skin turgor and color.   Neurologic: Cranial nerves II-XII grossly intact.   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    Relevant Orders    Follow Up [facility] - PCP - Established    Eczema L30.9    Relevant Orders    " Follow Up [facility] - PCP - Established    PCOS (polycystic ovarian syndrome) E28.2    Relevant Orders    Follow Up [facility] - PCP - Established    Pure hypercholesterolemia E78.00    Relevant Orders    Follow Up [facility] - PCP - Established    Vitamin B 12 deficiency E53.8    Relevant Orders    Follow Up [facility] - PCP - Established    Class 1 obesity due to excess calories without serious comorbidity with body mass index (BMI) of 31.0 to 31.9 in adult E66.09, Z68.31    Relevant Orders    Follow Up [facility] - PCP - Established    Osteoarthrosis M19.90    Relevant Orders    Follow Up [facility] - PCP - Established    Fatigue R53.83    Relevant Orders    Follow Up [facility] - PCP - Established    Vitamin D deficiency E55.9    Relevant Orders    Follow Up [facility] - PCP - Established     Other Visit Diagnoses         Codes    Left upper quadrant abdominal pain     R10.12    Relevant Orders    Follow Up [facility] - PCP - Established          Scribe Attestation  By signing my name below, I, Rica Otero MAibmelody   attest that this documentation has been prepared under the direction and in the presence of Ruben Ruiz MD.    Provider Attestation - Scribe documentation    All medical record entries made by the Scribe were at my direction and personally dictated by me. I have reviewed the chart and agree that the record accurately reflects my personal performance of the history, physical exam, discussion and plan.    -Insurance in past will not cover any weight loss injectables.       -Patient informed to check with insurance in regards to Qsymia.      -Discuss humira for hidradenitis in the future     -Pt currently taking Mounjaro 2.5 mg weekly     -All dx improved     Continue current medications and therapy for chronic medical conditions

## 2024-05-10 DIAGNOSIS — E66.09 CLASS 1 OBESITY DUE TO EXCESS CALORIES WITHOUT SERIOUS COMORBIDITY WITH BODY MASS INDEX (BMI) OF 31.0 TO 31.9 IN ADULT: ICD-10-CM

## 2024-05-10 DIAGNOSIS — E28.2 PCOS (POLYCYSTIC OVARIAN SYNDROME): ICD-10-CM

## 2024-05-10 PROCEDURE — RXMED WILLOW AMBULATORY MEDICATION CHARGE

## 2024-05-10 RX ORDER — TIRZEPATIDE 2.5 MG/.5ML
2.5 INJECTION, SOLUTION SUBCUTANEOUS
Qty: 2 ML | Refills: 1 | Status: SHIPPED | OUTPATIENT
Start: 2024-05-12

## 2024-05-13 ENCOUNTER — PHARMACY VISIT (OUTPATIENT)
Dept: PHARMACY | Facility: CLINIC | Age: 44
End: 2024-05-13
Payer: COMMERCIAL

## 2024-05-20 ENCOUNTER — APPOINTMENT (OUTPATIENT)
Dept: PHARMACY | Facility: HOSPITAL | Age: 44
End: 2024-05-20
Payer: COMMERCIAL

## 2024-05-28 ENCOUNTER — TELEMEDICINE (OUTPATIENT)
Dept: PHARMACY | Facility: HOSPITAL | Age: 44
End: 2024-05-28
Payer: COMMERCIAL

## 2024-05-28 DIAGNOSIS — E28.2 PCOS (POLYCYSTIC OVARIAN SYNDROME): ICD-10-CM

## 2024-05-28 DIAGNOSIS — E66.09 CLASS 1 OBESITY DUE TO EXCESS CALORIES WITHOUT SERIOUS COMORBIDITY WITH BODY MASS INDEX (BMI) OF 31.0 TO 31.9 IN ADULT: ICD-10-CM

## 2024-05-28 NOTE — PROGRESS NOTES
Pharmacist Clinic: Weight Management    Sandy Nguyen was referred to the Clinical Pharmacy Team for weight management.     Referring Provider: Ruben Ruiz MD    HISTORY OF PRESENT ILLNESS    Sandy Nguyen is a 42 year old female who was referred to the Clinical Pharmacy Team by Dr. Ruben Ruiz for weight management. Patient was prescribed Mounjaro 2.5mg once weekly, and states she was tolerating the medication very well. The first 2 weeks she was slightly nauseous, however this week she did not experience any side effects. Patient was increased to 5mg and states she was okay with the first dose, however after the second dose she was not feeling well. Patient took the medication on Wednesday, then Thursday morning she wasn't feeling well and Friday she threw up 3.5 times (dry heaving due to not having anything left to throw up). Patient states she felt a little better on Saturday, but Sunday started feeling bad again so went to the ED. They gave her fluids in the ED, her heart rate was increased but they believe this was due to nicotine withdrawal.     Patient states being on the medication, she has already seen positive results. Patient states she has lost at least 8lbs prior to not feeling well and states she has less unwanted hair growth due to her PCOS and does not feel as bloated when on the medication. After about a month off, patient wanted to restart Mounjaro. States she is feeling great, with no unwanted side effects. She has taken 2 doses and feeling great. Discussed patient avoiding fatty/oily/greasy food, eating smaller portions but more often throughout the day, and staying properly hydrated on the medication.      PHARMACY ASSESSMENT    Allergies: Penicillins     [id number] - [place], [place] - [address] [facility]  [address] [facility] [address]  Phone: [phone] Fax: [phone]    [facility]  [address]  [facility]  [address]  Phone: [phone] Fax: [phone]    CURRENT PHARMACOTHERAPY  Mounjaro 2.5mg under the skin once weekly (Saturday)    DRUG INTERACTIONS  No significant drug-drug interactions exist that require adjustment to therapy    LAB  Lab Results   Component Value Date    BILITOT 0.8 04/21/2024    CALCIUM 9.1 04/21/2024    CO2 21 04/21/2024     04/21/2024    CREATININE 0.66 04/21/2024    GLUCOSE 142 (H) 04/21/2024    ALKPHOS 87 04/21/2024    K 3.7 04/21/2024    PROT 7.1 04/21/2024     04/21/2024    AST 21 04/21/2024    ALT 19 04/21/2024    BUN 15 04/21/2024    ANIONGAP 20 04/21/2024    ALBUMIN 4.9 04/21/2024    LIPASE 58 04/21/2024    EGFR >90 04/21/2024     Lab Results   Component Value Date    TRIG 65 03/27/2024    CHOL 141 03/27/2024    LDLCALC 80 03/27/2024    HDL 48.0 03/27/2024     Lab Results   Component Value Date    HGBA1C 5.1 03/27/2024       Current Outpatient Medications on File Prior to Visit   Medication Sig Dispense Refill    atorvastatin (Lipitor) 20 mg tablet Take 1 tablet (20 mg) by mouth once daily. 90 tablet 1    citalopram (CeleXA) 20 mg tablet TAKE ONE TABLET BY MOUTH EVERY DAY 90 tablet 0    clindamycin (Cleocin T) 1 % lotion Apply topically 2 times a day.      lisinopril 5 mg tablet TAKE ONE TABLET BY MOUTH EVERY DAY 90 tablet 0    metoprolol succinate XL (Toprol-XL) 25 mg 24 hr tablet Take 0.5 tablets (12.5 mg) by mouth once daily. Do not crush or chew. 30 tablet 1    multivitamin with minerals (multivitamin-iron-folic acid) tablet Take 1 tablet by mouth once daily.      phentermine (Adipex-P) 37.5 mg tablet Take 1 tablet (37.5 mg) by mouth once daily in the morning. Take before meals. 30 tablet 0    promethazine (Phenergan) 25 mg suppository Insert 1 suppository (25 mg) into the rectum every 6 hours if needed for nausea or vomiting. 15 suppository 0    tirzepatide (Mounjaro) 2.5 mg/0.5 mL pen injector Inject 2.5 mg under the skin 1 (one) time per week. 2 mL 1    triamcinolone  (Kenalog) 0.1 % cream apply topically to hands 1 to 2 times daily as directed until resolved      vit C-Zn gluc-herbal no.325 (Elderberry Zinc Vit C) 90-15 mg lozenge Take by mouth.       No current facility-administered medications on file prior to visit.     PATIENT EDUCATION/GOALS  Mounjaro Education:   Counseled patient on MOA, expectations, side effects, duration of therapy, contraindications, administration, and monitoring parameters  Provided detailed dosing and administration counseling to ensure proper technique.   Reviewed Mounjaro titration schedule, starting with 2.5 mg once weekly to a goal of 15 mg once weekly if tolerated  Counseled patient on the benefits of GLP-1ra glycemic control and weight loss  Reviewed storage requirements of Mounjaro when not in use, and when to administer the medication if a dose is missed.  Advised patient that they may experience improved satiety after meals and portion sizes of meals may be reduced as doses of Mounjaro increase.  Discussed with patient the off-label use of once-weekly Mounjaro  which is indicated and approved by the FDA for Type Two Diabetes Mellitus treatment. Medication cost and coverage for this agent may change at any time as determined by your primary insurance carrier.   Answered all patient questions and concerns; provided [facility] phone number if issues/questions arise    RECOMMENDATIONS/PLAN  CONTINUE Mounjaro 2.5mg under the skin once weekly, once feeling back to normal. Patient has a refill at [facility]  Patient is tolerating the medication well, but due to side effects experienced last month, will stay on the 2.5mg dose for another month to avoid any unwanted side effects.  Continue working on healthy diet and lifestyle.    Follow up with the Clinical Pharmacy Team 6/25/2024 at 4:30PM    Continue all meds under the continuation of care with the referring provider and clinical pharmacy team.    Please reach out to the Clinical Pharmacy Team  if there are any further questions.     Verbal consent to manage patient's drug therapy was obtained from patient. They were informed they may decline to participate or withdraw from participation in pharmacy services at any time.    Sultana Galeana, PharmD  [phone]

## 2024-06-03 PROCEDURE — RXMED WILLOW AMBULATORY MEDICATION CHARGE

## 2024-06-04 ENCOUNTER — PHARMACY VISIT (OUTPATIENT)
Dept: PHARMACY | Facility: CLINIC | Age: 44
End: 2024-06-04
Payer: COMMERCIAL

## 2024-06-21 DIAGNOSIS — F32.9 REACTIVE DEPRESSION: ICD-10-CM

## 2024-06-21 DIAGNOSIS — I10 PRIMARY HYPERTENSION: ICD-10-CM

## 2024-06-21 RX ORDER — CITALOPRAM 20 MG/1
20 TABLET, FILM COATED ORAL DAILY
Qty: 90 TABLET | Refills: 0 | Status: SHIPPED | OUTPATIENT
Start: 2024-06-21

## 2024-06-21 RX ORDER — LISINOPRIL 5 MG/1
5 TABLET ORAL DAILY
Qty: 90 TABLET | Refills: 0 | Status: SHIPPED | OUTPATIENT
Start: 2024-06-21

## 2024-06-25 ENCOUNTER — APPOINTMENT (OUTPATIENT)
Dept: PHARMACY | Facility: HOSPITAL | Age: 44
End: 2024-06-25
Payer: COMMERCIAL

## 2024-06-25 DIAGNOSIS — E66.09 CLASS 1 OBESITY DUE TO EXCESS CALORIES WITHOUT SERIOUS COMORBIDITY WITH BODY MASS INDEX (BMI) OF 31.0 TO 31.9 IN ADULT: ICD-10-CM

## 2024-06-25 NOTE — PROGRESS NOTES
Pharmacist Clinic: Weight Management    Sandy Nguyen was referred to the Clinical Pharmacy Team for weight management.     Referring Provider: Ruben Ruiz MD    HISTORY OF PRESENT ILLNESS    Sandy Nguyen is a 42 year old female who was referred to the Clinical Pharmacy Team by Dr. Ruben Ruiz for weight management. Patient was prescribed Mounjaro 2.5mg once weekly, and states she was tolerating the medication very well. The first 2 weeks she was slightly nauseous, however this week she did not experience any side effects. Patient was increased to 5mg and states she was okay with the first dose, however after the second dose she was not feeling well. Patient took the medication on Wednesday, then Thursday morning she wasn't feeling well and Friday she threw up 3.5 times (dry heaving due to not having anything left to throw up). Patient states she felt a little better on Saturday, but Sunday started feeling bad again so went to the ED. They gave her fluids in the ED, her heart rate was increased but they believe this was due to nicotine withdrawal.     Patient states being on the medication, she has already seen positive results. Patient states she has lost at least 8lbs prior to not feeling well and states she has less unwanted hair growth due to her PCOS and does not feel as bloated when on the medication. After about a month off, patient wanted to restart Mounjaro. Patient has taken 6 doses of Mounjaro 2.5mg. She was tolerating the medication well until this past Monday. She took the dose on Saturday, at the end of her vacation week, and then Monday she felt nausea and threw up. She felt bad for 12-14 hours, but since has been feeling fine. Patient states she believes she is still experiencing weight loss but has tapered off some. Discussed patient avoiding fatty/oily/greasy food, eating smaller portions but more often throughout the day, and staying properly hydrated on the  medication.      PHARMACY ASSESSMENT    Allergies: Penicillins     [facility] - [facility], [place] - [address]  [address]  [facility] [address]  Phone: [phone] Fax: [phone]    [facility]  [address], [address]  [facility] [address]  Phone: [phone] Fax: [phone]    CURRENT PHARMACOTHERAPY  Mounjaro 2.5mg under the skin once weekly (Saturday)    DRUG INTERACTIONS  No significant drug-drug interactions exist that require adjustment to therapy    LAB  Lab Results   Component Value Date    BILITOT 0.8 04/21/2024    CALCIUM 9.1 04/21/2024    CO2 21 04/21/2024     04/21/2024    CREATININE 0.66 04/21/2024    GLUCOSE 142 (H) 04/21/2024    ALKPHOS 87 04/21/2024    K 3.7 04/21/2024    PROT 7.1 04/21/2024     04/21/2024    AST 21 04/21/2024    ALT 19 04/21/2024    BUN 15 04/21/2024    ANIONGAP 20 04/21/2024    ALBUMIN 4.9 04/21/2024    LIPASE 58 04/21/2024    EGFR >90 04/21/2024     Lab Results   Component Value Date    TRIG 65 03/27/2024    CHOL 141 03/27/2024    LDLCALC 80 03/27/2024    HDL 48.0 03/27/2024     Lab Results   Component Value Date    HGBA1C 5.1 03/27/2024       Current Outpatient Medications on File Prior to Visit   Medication Sig Dispense Refill    atorvastatin (Lipitor) 20 mg tablet Take 1 tablet (20 mg) by mouth once daily. 90 tablet 1    citalopram (CeleXA) 20 mg tablet TAKE ONE TABLET BY MOUTH EVERY DAY 90 tablet 0    clindamycin (Cleocin T) 1 % lotion Apply topically 2 times a day.      lisinopril 5 mg tablet TAKE ONE TABLET BY MOUTH EVERY DAY 90 tablet 0    metoprolol succinate XL (Toprol-XL) 25 mg 24 hr tablet Take 0.5 tablets (12.5 mg) by mouth once daily. Do not crush or chew. 30 tablet 1    multivitamin with minerals (multivitamin-iron-folic acid) tablet Take 1 tablet by mouth once daily.      phentermine (Adipex-P) 37.5 mg tablet Take 1 tablet (37.5 mg) by mouth once daily in the morning. Take before meals. 30 tablet  0    promethazine (Phenergan) 25 mg suppository Insert 1 suppository (25 mg) into the rectum every 6 hours if needed for nausea or vomiting. 15 suppository 0    tirzepatide (Mounjaro) 2.5 mg/0.5 mL pen injector Inject 2.5 mg under the skin 1 (one) time per week. 2 mL 1    triamcinolone (Kenalog) 0.1 % cream apply topically to hands 1 to 2 times daily as directed until resolved      vit C-Zn gluc-herbal no.325 (Elderberry Zinc Vit C) 90-15 mg lozenge Take by mouth.      [DISCONTINUED] citalopram (CeleXA) 20 mg tablet TAKE ONE TABLET BY MOUTH EVERY DAY 90 tablet 0    [DISCONTINUED] lisinopril 5 mg tablet TAKE ONE TABLET BY MOUTH EVERY DAY 90 tablet 0     No current facility-administered medications on file prior to visit.     PATIENT EDUCATION/GOALS  Mounjaro Education:   Counseled patient on MOA, expectations, side effects, duration of therapy, contraindications, administration, and monitoring parameters  Provided detailed dosing and administration counseling to ensure proper technique.   Reviewed Mounjaro titration schedule, starting with 2.5 mg once weekly to a goal of 15 mg once weekly if tolerated  Counseled patient on the benefits of GLP-1ra glycemic control and weight loss  Reviewed storage requirements of Mounjaro when not in use, and when to administer the medication if a dose is missed.  Advised patient that they may experience improved satiety after meals and portion sizes of meals may be reduced as doses of Mounjaro increase.  Discussed with patient the off-label use of once-weekly Mounjaro  which is indicated and approved by the FDA for Type Two Diabetes Mellitus treatment. Medication cost and coverage for this agent may change at any time as determined by your primary insurance carrier.   Answered all patient questions and concerns; provided [facility] phone number if issues/questions arise    RECOMMENDATIONS/PLAN  CONTINUE Mounjaro 2.5mg under the skin once weekly. Will see how the next dose goes prior to  increasing to 5mg.  Patient felt very nauseous after her last dose, so will follow back up prior to needing a refill.  Continue working on healthy diet and lifestyle.    Follow up with the Clinical Pharmacy Team 7/9/2024 at 4:30PM    Continue all meds under the continuation of care with the referring provider and clinical pharmacy team.    Please reach out to the Clinical Pharmacy Team if there are any further questions.     Verbal consent to manage patient's drug therapy was obtained from patient. They were informed they may decline to participate or withdraw from participation in pharmacy services at any time.    Sultana Galeana, PharmD  [phone]

## 2024-07-01 DIAGNOSIS — E78.00 PURE HYPERCHOLESTEROLEMIA: ICD-10-CM

## 2024-07-02 RX ORDER — ATORVASTATIN CALCIUM 20 MG/1
20 TABLET, FILM COATED ORAL DAILY
Qty: 90 TABLET | Refills: 0 | Status: SHIPPED | OUTPATIENT
Start: 2024-07-02

## 2024-07-09 ENCOUNTER — APPOINTMENT (OUTPATIENT)
Dept: PHARMACY | Facility: HOSPITAL | Age: 44
End: 2024-07-09
Payer: COMMERCIAL

## 2024-07-10 ENCOUNTER — APPOINTMENT (OUTPATIENT)
Dept: PRIMARY CARE | Facility: CLINIC | Age: 44
End: 2024-07-10
Payer: COMMERCIAL

## 2024-07-10 VITALS
OXYGEN SATURATION: 98 % | HEIGHT: 64 IN | DIASTOLIC BLOOD PRESSURE: 74 MMHG | TEMPERATURE: 97.1 F | SYSTOLIC BLOOD PRESSURE: 128 MMHG | HEART RATE: 74 BPM | BODY MASS INDEX: 28.34 KG/M2 | WEIGHT: 166 LBS

## 2024-07-10 DIAGNOSIS — R53.83 FATIGUE, UNSPECIFIED TYPE: ICD-10-CM

## 2024-07-10 DIAGNOSIS — R11.0 NAUSEA: ICD-10-CM

## 2024-07-10 DIAGNOSIS — L30.9 ECZEMA, UNSPECIFIED TYPE: ICD-10-CM

## 2024-07-10 DIAGNOSIS — E55.9 VITAMIN D DEFICIENCY: ICD-10-CM

## 2024-07-10 DIAGNOSIS — I10 PRIMARY HYPERTENSION: ICD-10-CM

## 2024-07-10 DIAGNOSIS — R10.12 LEFT UPPER QUADRANT ABDOMINAL PAIN: ICD-10-CM

## 2024-07-10 DIAGNOSIS — E53.8 VITAMIN B 12 DEFICIENCY: ICD-10-CM

## 2024-07-10 DIAGNOSIS — M19.90 OSTEOARTHRITIS, UNSPECIFIED OSTEOARTHRITIS TYPE, UNSPECIFIED SITE: ICD-10-CM

## 2024-07-10 DIAGNOSIS — E28.2 PCOS (POLYCYSTIC OVARIAN SYNDROME): ICD-10-CM

## 2024-07-10 DIAGNOSIS — E78.00 PURE HYPERCHOLESTEROLEMIA: ICD-10-CM

## 2024-07-10 DIAGNOSIS — E66.09 CLASS 1 OBESITY DUE TO EXCESS CALORIES WITHOUT SERIOUS COMORBIDITY WITH BODY MASS INDEX (BMI) OF 31.0 TO 31.9 IN ADULT: ICD-10-CM

## 2024-07-10 DIAGNOSIS — E78.41 ELEVATED LIPOPROTEIN(A): ICD-10-CM

## 2024-07-10 PROCEDURE — 99214 OFFICE O/P EST MOD 30 MIN: CPT | Performed by: FAMILY MEDICINE

## 2024-07-10 RX ORDER — TIRZEPATIDE 5 MG/.5ML
5 INJECTION, SOLUTION SUBCUTANEOUS
Qty: 2 ML | Refills: 0 | Status: SHIPPED | OUTPATIENT
Start: 2024-07-14 | End: 2024-07-12 | Stop reason: SDUPTHER

## 2024-07-10 RX ORDER — PROCHLORPERAZINE MALEATE 10 MG
10 TABLET ORAL 3 TIMES DAILY PRN
Qty: 20 TABLET | Refills: 0 | Status: SHIPPED | OUTPATIENT
Start: 2024-07-10 | End: 2024-09-08

## 2024-07-10 NOTE — PROGRESS NOTES
Subjective   Patient ID: Sandy Nguyen is a 43 y.o. female who presents for Weight Gain.    HPI   Pt is currently taking Mounjaro.  Pt would like to discuss getting a increase on her Mounjaro.  Patient has lost 10 lbs since May 2024.   She has done 2 months at 2.5 mg due to the 2nd month was tried to go to the 5 mg but was too much.   Sultana Galeana is managing the Mounjaro refill through [facility].     Patient is asking for Compazine 10 mg for nausea. The medication is the only one able to control the nausea. She does get with the injectable of the Mounjaro.     Review of Systems  12 Systems have been reviewed as follows.  Constitutional: Fever, weight gain, weight loss, appetite change, night sweats, fatigue, chills.  Eyes : blurry, double vision, vision, loss, tearing, redness, pain, sensitivity to light, glaucoma.  Ears, nose, mouth, and throat: Hearing loss, ringing in the ears, ear pain, nasal congestion, nasal drainage, nosebleeds, mouth, throat, irritation tooth problem.  Cardiovascular :chest pain, pressure, heart racing, palpitations, sweating, leg swelling, high or low blood pressure  Pulmonary: Cough, yellow or green sputum, blood and sputum, shortness of breath, wheezing  Gastrointestinal: Nausea, vomiting, diarrhea, constipation, pain, blood in stool, or vomitus, heartburn, difficulty swallowing  Genitourinary: incontinence, abnormal bleeding, abnormal discharge, urinary frequency, urinary hesitancy, pain, impotence sexual problem, infection, urinary retention  Musculoskeletal: Pain, stiffness, joint, redness or warmth, arthritis, back pain, weakness, muscle wasting, sprain or fracture  Neuro: Weight weakness, dizziness, change in voice, change in taste change in vision, change in hearing, loss, or change of sensation, trouble walking, balance problems coordination problems, shaking, speech problem  Endocrine , cold or heat intolerance, blood sugar problem, weight gain or loss missed periods hot flashes,  "sweats, change in body hair, change in libido, increased thirst, increased urination  Heme/lymph: Swelling, bleeding, problem anemia, bruising, enlarged lymph nodes  Allergic/immunologic: H. plus nasal drip, watery itchy eyes, nasal drainage, immunosuppressed  The above were reviewed and noted negative except as noted in HPI and Problem List.      Objective   /74 (BP Location: Left arm, Patient Position: Sitting, BP Cuff Size: Adult)   Pulse 74   Temp 36.2 °C (97.1 °F)   Ht 1.626 m (5' 4\")   Wt 75.3 kg (166 lb)   SpO2 98%   BMI 28.49 kg/m²     Physical Exam  Constitutional: Well developed, well nourished, alert and in no acute distress   Eyes: Normal external exam. Pupils equally round and reactive to light with normal accommodation and extraocular movements intact.  Neck: Supple, no lymphadenopathy or masses.   Cardiovascular: Regular rate and rhythm, normal S1 and S2, no murmurs, gallops, or rubs. Radial pulses normal. No peripheral edema.  Pulmonary: No respiratory distress, lungs clear to auscultation bilaterally. No wheezes, rhonchi, rales.  Abdomen: soft,non tender, non distended, without masses or HSM  Skin: Warm, well perfused, normal skin turgor and color.   Neurologic: Cranial nerves II-XII grossly intact.   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    Relevant Orders    Follow Up In [facility] - PCP - Established    Eczema L30.9    Relevant Orders    Follow Up In [facility] - PCP - Established    PCOS (polycystic ovarian syndrome) E28.2    Relevant Orders    Follow Up In [facility] - PCP - Established    Pure hypercholesterolemia E78.00    Relevant Orders    Follow Up In [facility] " - PCP - Established    Vitamin B 12 deficiency E53.8    Relevant Orders    Follow Up In [facility] - PCP - Established    Class 1 obesity due to excess calories without serious comorbidity with body mass index (BMI) of 31.0 to 31.9 in adult E66.09, Z68.31    Relevant Medications    tirzepatide (Mounjaro) 5 mg/0.5 mL pen injector (Start on 7/14/2024)    Other Relevant Orders    Follow Up In [facility] - PCP - Established    Osteoarthrosis M19.90    Relevant Orders    Follow Up In [facility] - PCP - Established    Fatigue R53.83    Relevant Orders    Follow Up In [facility] - PCP - Established    Vitamin D deficiency E55.9    Relevant Orders    Follow Up In [facility] - PCP - Established     Other Visit Diagnoses         Codes    Left upper quadrant abdominal pain     R10.12    Relevant Orders    Follow Up In [facility] - PCP - Established    Nausea     R11.0    Relevant Medications    prochlorperazine (Compazine) 10 mg tablet            Continue current medications and therapy for chronic medical conditions.    Patient was advised importance of proper diet/nutrition in addition adequate hydration. Patient was encouraged moderate exercise program to include 30 minutes daily for 5 days of the week or 150 minutes weekly. Patient will follow-up with us as scheduled.    Increase Mounjaro to 5 mg weekly    -Discuss humira for hidradenitis in the future     -All dx improved     Scribe Attestation  By signing my name below, I, Ruben Ruiz MD , Scribe   attest that this documentation has been prepared under the direction and in the presence of Ruben Ruiz MD.      Provider Attestation - Scribe documentation    All medical record entries made by the Scribe were at my direction and personally dictated by me. I have reviewed the chart and agree that the record accurately reflects my personal performance of the history, physical exam, discussion and  plan.

## 2024-07-12 ENCOUNTER — TELEMEDICINE (OUTPATIENT)
Dept: PHARMACY | Facility: HOSPITAL | Age: 44
End: 2024-07-12
Payer: COMMERCIAL

## 2024-07-12 DIAGNOSIS — E66.09 CLASS 1 OBESITY DUE TO EXCESS CALORIES WITHOUT SERIOUS COMORBIDITY WITH BODY MASS INDEX (BMI) OF 31.0 TO 31.9 IN ADULT: ICD-10-CM

## 2024-07-12 RX ORDER — TIRZEPATIDE 5 MG/.5ML
5 INJECTION, SOLUTION SUBCUTANEOUS
Qty: 2 ML | Refills: 0 | Status: SHIPPED | OUTPATIENT
Start: 2024-07-14

## 2024-07-12 NOTE — PROGRESS NOTES
Pharmacist Clinic: Weight Management    Sandy Nguyen was referred to the Clinical Pharmacy Team for weight management.     Referring Provider: Ruben Ruiz MD    HISTORY OF PRESENT ILLNESS    Sandy Nguyen is a 42 year old female who was referred to the Clinical Pharmacy Team by Dr. Ruben Ruiz for weight management. Patient was prescribed Mounjaro 2.5mg once weekly, and states she was tolerating the medication very well. The first 2 weeks she was slightly nauseous, however this week she did not experience any side effects. Patient was increased to 5mg and states she was okay with the first dose, however after the second dose she was not feeling well. Patient took the medication on Wednesday, then Thursday morning she wasn't feeling well and Friday she threw up 3.5 times (dry heaving due to not having anything left to throw up). Patient states she felt a little better on Saturday, but Sunday started feeling bad again so went to the ED. They gave her fluids in the ED, her heart rate was increased but they believe this was due to nicotine withdrawal.     Patient states being on the medication, she has already seen positive results. Patient states she has lost about 20lbs and states she has less unwanted hair growth due to her PCOS and does not feel as bloated when on the medication. After about a month off, patient wanted to restart Mounjaro. Patient has taken 8 doses of Mounjaro 2.5mg. She was tolerating the medication well until this past Monday. She took the dose on Saturday, at the end of her vacation week, and then Monday she felt nausea and threw up. She felt bad for 12-14 hours, but since has been feeling fine. Patient states she believes she is still experiencing weight loss but has tapered off some. Discussed patient avoiding fatty/oily/greasy food, eating smaller portions but more often throughout the day, and staying properly hydrated on the medication.      PHARMACY  ASSESSMENT    Allergies: Penicillins     [id number] - [facility], [place] - [address]  [address] [facility] [address]  Phone: [phone] Fax: [phone]    [facility]  [address], [address]  [facility] [address]  Phone: [phone] Fax: [phone]    CURRENT PHARMACOTHERAPY  Mounjaro 2.5mg under the skin once weekly (Saturday)    DRUG INTERACTIONS  No significant drug-drug interactions exist that require adjustment to therapy    LAB  Lab Results   Component Value Date    BILITOT 0.8 04/21/2024    CALCIUM 9.1 04/21/2024    CO2 21 04/21/2024     04/21/2024    CREATININE 0.66 04/21/2024    GLUCOSE 142 (H) 04/21/2024    ALKPHOS 87 04/21/2024    K 3.7 04/21/2024    PROT 7.1 04/21/2024     04/21/2024    AST 21 04/21/2024    ALT 19 04/21/2024    BUN 15 04/21/2024    ANIONGAP 20 04/21/2024    ALBUMIN 4.9 04/21/2024    LIPASE 58 04/21/2024    EGFR >90 04/21/2024     Lab Results   Component Value Date    TRIG 65 03/27/2024    CHOL 141 03/27/2024    LDLCALC 80 03/27/2024    HDL 48.0 03/27/2024     Lab Results   Component Value Date    HGBA1C 5.1 03/27/2024       Current Outpatient Medications on File Prior to Visit   Medication Sig Dispense Refill    atorvastatin (Lipitor) 20 mg tablet TAKE ONE TABLET BY MOUTH EVERY DAY 90 tablet 0    citalopram (CeleXA) 20 mg tablet TAKE ONE TABLET BY MOUTH EVERY DAY 90 tablet 0    clindamycin (Cleocin T) 1 % lotion Apply topically 2 times a day.      lisinopril 5 mg tablet TAKE ONE TABLET BY MOUTH EVERY DAY 90 tablet 0    metoprolol succinate XL (Toprol-XL) 25 mg 24 hr tablet Take 0.5 tablets (12.5 mg) by mouth once daily. Do not crush or chew. 30 tablet 1    multivitamin with minerals (multivitamin-iron-folic acid) tablet Take 1 tablet by mouth once daily.      phentermine (Adipex-P) 37.5 mg tablet Take 1 tablet (37.5 mg) by mouth once daily in the morning. Take before meals. (Patient not taking: Reported on 7/10/2024)  30 tablet 0    prochlorperazine (Compazine) 10 mg tablet Take 1 tablet (10 mg) by mouth 3 times a day as needed for nausea. 20 tablet 0    promethazine (Phenergan) 25 mg suppository Insert 1 suppository (25 mg) into the rectum every 6 hours if needed for nausea or vomiting. 15 suppository 0    triamcinolone (Kenalog) 0.1 % cream apply topically to hands 1 to 2 times daily as directed until resolved      vit C-Zn gluc-herbal no.325 (Elderberry Zinc Vit C) 90-15 mg lozenge Take by mouth.      [DISCONTINUED] tirzepatide (Mounjaro) 2.5 mg/0.5 mL pen injector Inject 2.5 mg under the skin 1 (one) time per week. 2 mL 1    [DISCONTINUED] tirzepatide (Mounjaro) 5 mg/0.5 mL pen injector Inject 5 mg under the skin 1 (one) time per week. 2 mL 0     No current facility-administered medications on file prior to visit.     PATIENT EDUCATION/GOALS  Mounjaro Education:   Counseled patient on MOA, expectations, side effects, duration of therapy, contraindications, administration, and monitoring parameters  Provided detailed dosing and administration counseling to ensure proper technique.   Reviewed Mounjaro titration schedule, starting with 2.5 mg once weekly to a goal of 15 mg once weekly if tolerated  Counseled patient on the benefits of GLP-1ra glycemic control and weight loss  Reviewed storage requirements of Mounjaro when not in use, and when to administer the medication if a dose is missed.  Advised patient that they may experience improved satiety after meals and portion sizes of meals may be reduced as doses of Mounjaro increase.  Discussed with patient the off-label use of once-weekly Mounjaro  which is indicated and approved by the FDA for Type Two Diabetes Mellitus treatment. Medication cost and coverage for this agent may change at any time as determined by your primary insurance carrier.   Answered all patient questions and concerns; provided [facility] phone number if issues/questions arise    RECOMMENDATIONS/PLAN  INCREASE  Mounjaro to 5mg under the skin once weekly. Will send medication to [facility] [address] [facility] for mail order.  Patient feels comfortable increasing dose. Will also have compazine refilled if needed.  Continue working on healthy diet and lifestyle.    Follow up with the Clinical Pharmacy Team 8/8/2024 at 4:45PM. Encouraged patient to call [facility] if any issues when dose increased.    Continue all meds under the continuation of care with the referring provider and clinical pharmacy team.    Please reach out to the Clinical Pharmacy Team if there are any further questions.     Verbal consent to manage patient's drug therapy was obtained from patient. They were informed they may decline to participate or withdraw from participation in pharmacy services at any time.    Sultana Galeana, PharmD  [phone]

## 2024-07-15 ENCOUNTER — PHARMACY VISIT (OUTPATIENT)
Dept: PHARMACY | Facility: CLINIC | Age: 44
End: 2024-07-15
Payer: COMMERCIAL

## 2024-07-15 PROCEDURE — RXMED WILLOW AMBULATORY MEDICATION CHARGE

## 2024-07-23 ENCOUNTER — TELEPHONE (OUTPATIENT)
Dept: PHARMACY | Facility: HOSPITAL | Age: 44
End: 2024-07-23
Payer: COMMERCIAL

## 2024-07-23 DIAGNOSIS — E66.09 CLASS 1 OBESITY DUE TO EXCESS CALORIES WITHOUT SERIOUS COMORBIDITY WITH BODY MASS INDEX (BMI) OF 31.0 TO 31.9 IN ADULT: Primary | ICD-10-CM

## 2024-07-23 PROCEDURE — RXMED WILLOW AMBULATORY MEDICATION CHARGE

## 2024-07-23 RX ORDER — TIRZEPATIDE 2.5 MG/.5ML
2.5 INJECTION, SOLUTION SUBCUTANEOUS
Qty: 2 ML | Refills: 0 | Status: SHIPPED | OUTPATIENT
Start: 2024-07-28

## 2024-07-23 NOTE — TELEPHONE ENCOUNTER
Patient called [facility] to discuss Mounjaro. Patient increased to 5mg on 7/13 and started experiencing side effects right away. Patient did not take her dose on 7/20 and would like to decrease back down to 2.5mg again. Will send medication to [facility] for mail order.    [facility] will look into other options - Ozempic - to see if there is other coverage that will limit side effects for the patient.

## 2024-07-25 ENCOUNTER — PHARMACY VISIT (OUTPATIENT)
Dept: PHARMACY | Facility: CLINIC | Age: 44
End: 2024-07-25
Payer: COMMERCIAL

## 2024-08-08 ENCOUNTER — APPOINTMENT (OUTPATIENT)
Dept: PHARMACY | Facility: HOSPITAL | Age: 44
End: 2024-08-08
Payer: COMMERCIAL

## 2024-08-08 DIAGNOSIS — E66.09 CLASS 1 OBESITY DUE TO EXCESS CALORIES WITHOUT SERIOUS COMORBIDITY WITH BODY MASS INDEX (BMI) OF 31.0 TO 31.9 IN ADULT: ICD-10-CM

## 2024-08-08 RX ORDER — TIRZEPATIDE 2.5 MG/.5ML
2.5 INJECTION, SOLUTION SUBCUTANEOUS
Qty: 2 ML | Refills: 0 | Status: SHIPPED | OUTPATIENT
Start: 2024-08-11

## 2024-08-08 NOTE — PROGRESS NOTES
Clinical Pharmacy Appointment    Patient ID: Sandy Nguyen is a 43 y.o. female who presents for Weight Loss.    Pt is here for Follow Up appointment.     Referring Provider: Ruben Ruiz MD  PCP: Ruben Ruiz MD   Last visit with PCP: 7/10/2024   Next visit with PCP: 9/25/2024      Subjective     Interval History  No issues since resuming the Mounjaro 2.5 mg weekly dose, feeling well and continuing to lose weight.    HPI  WEIGHT LOSS  PMH significant for HTN/HLD, obesity, PCOS, osteoarthritis.  Special needs/barriers to therapy:  history of intolerance to higher doses of Mounjaro; cost    BMI Readings from Last 1 Encounters:   07/10/24 28.49 kg/m²     Believes she has lost at least 30 lbs since starting therapy.    Lab Results   Component Value Date    HGBA1C 5.1 03/27/2024    GLUCOSE 142 (H) 04/21/2024   Hyperglycemia: Denies signs and symptoms    Lifestyle  Denies recent changes to diet or exercise.    Other Potential Contributing Factors  Medications that may cause weight gain:  citalopram  Mental health: No current concerns  Eating Disorders: Denies Hx of any eating disorder  Comorbidities: dyslipidemias, hypertension, and osteoarthritis    Non-Pharmacological Therapy  Weight loss techniques attempted:  Self-directed dieting: and self-directed exercise.    Pharmacological Therapy  Current Medications: Mounjaro 2.5 mg subQ once weekly  Previous Medications: Mounjaro 5 mg weekly- N/V     Adherence: Takes medication as directed and reports no missed doses  Adverse Effects: currently tolerating well    Insurance coverage of weight-loss medications? Yes, only Mounjaro     Preventative Care  ASCVD Risk:   The 10-year ASCVD risk score (Reba DÍAZ, et al., 2019) is: 2.6%    Values used to calculate the score:      Age: 43 years      Sex: Female      Is Non- : No      Diabetic: No      Tobacco smoker: Yes      Systolic Blood Pressure: 128 mmHg      Is BP treated: Yes      HDL  Cholesterol: 48 mg/dL      Total Cholesterol: 141 mg/dL  On Statin?: Yes, atorvastatin 20 mg    Immunizations Needed: Tdap    Medication Reconciliation:  Changed:   Added:   Discontinued:     Drug Interactions  No relevant drug interactions were noted.    Medication System Management  Patient's preferred pharmacy:  [facility]  Adherence/Organization: appropriate  Affordability/Accessibility: no issues with Mounjaro      Objective   Allergies   Allergen Reactions    Penicillins Hives     Social History     Social History Narrative    Not on file      Medication Review  Current Outpatient Medications   Medication Instructions    atorvastatin (LIPITOR) 20 mg, oral, Daily    citalopram (CELEXA) 20 mg, oral, Daily    clindamycin (Cleocin T) 1 % lotion Topical, 2 times daily    lisinopril 5 mg, oral, Daily    metoprolol succinate XL (TOPROL-XL) 12.5 mg, oral, Daily, Do not crush or chew.    [START ON 8/11/2024] Mounjaro 2.5 mg, subcutaneous, Once Weekly    multivitamin with minerals (multivitamin-iron-folic acid) tablet 1 tablet, oral, Daily    phentermine (ADIPEX-P) 37.5 mg, oral, Daily before breakfast    prochlorperazine (COMPAZINE) 10 mg, oral, 3 times daily PRN    promethazine (PHENERGAN) 25 mg, rectal, Every 6 hours PRN    triamcinolone (Kenalog) 0.1 % cream apply topically to hands 1 to 2 times daily as directed until resolved    vit C-Zn gluc-herbal no.325 (Elderberry Zinc Vit C) 90-15 mg lozenge oral      Vitals  BP Readings from Last 2 Encounters:   07/10/24 128/74   05/08/24 132/84     BMI Readings from Last 1 Encounters:   07/10/24 28.49 kg/m²      Labs  A1C  Lab Results   Component Value Date    HGBA1C 5.1 03/27/2024     BMP  Lab Results   Component Value Date    CALCIUM 9.1 04/21/2024     04/21/2024    K 3.7 04/21/2024    CO2 21 04/21/2024     04/21/2024    BUN 15 04/21/2024    CREATININE 0.66 04/21/2024    EGFR >90 04/21/2024     LFTs  Lab Results   Component Value Date    ALT 19 04/21/2024     "AST 21 04/21/2024    ALKPHOS 87 04/21/2024    BILITOT 0.8 04/21/2024     FLP  Lab Results   Component Value Date    TRIG 65 03/27/2024    CHOL 141 03/27/2024    LDLF 114 (H) 05/03/2023    LDLCALC 80 03/27/2024    HDL 48.0 03/27/2024     Urine Microalbumin  No results found for: \"MICROALBCREA\"  Weight Management  Wt Readings from Last 3 Encounters:   07/10/24 75.3 kg (166 lb)   05/08/24 79.8 kg (176 lb)   04/21/24 81.6 kg (180 lb)      There is no height or weight on file to calculate BMI.     Assessment/Plan   Problem List Items Addressed This Visit          Endocrine/Metabolic    Class 1 obesity due to excess calories without serious comorbidity with body mass index (BMI) of 31.0 to 31.9 in adult     Current regimen includes Mounjaro 2.5 mg weekly. Has lost 30 lbs since starting therapy plan. Due to continued progress on Mounjaro 2.5 mg, plan to continue current therapy and follow-up in 6 weeks.    Medication Changes:  CONTINUE  Mounjaro 2.5 mg once weekly    Monitoring and Education:  All questions and concerns addressed. Contact pharmacist with any further questions or concerns prior to next appointment.          Relevant Medications    tirzepatide (Mounjaro) 2.5 mg/0.5 mL pen injector (Start on 8/11/2024)    Other Relevant Orders    Follow Up In [facility] - Pharmacy       Clinical Pharmacist follow-up: 9/19 4:45 PM, Telehealth visit    Continue all meds under the continuation of care with the referring provider and clinical pharmacy team.    Thank you,  Ally George, PharmD  Clinical Pharmacist  [phone] (Phone Number)    Verbal consent to manage patient's drug therapy was obtained from the patient. They were informed they may decline to participate or withdraw from participation in pharmacy services at any time.   "

## 2024-08-08 NOTE — ASSESSMENT & PLAN NOTE
Current regimen includes Mounjaro 2.5 mg weekly. Has lost 30 lbs since starting therapy plan. Due to continued progress on Mounjaro 2.5 mg, plan to continue current therapy and follow-up in 6 weeks.    Medication Changes:  CONTINUE  Mounjaro 2.5 mg once weekly    Monitoring and Education:  All questions and concerns addressed. Contact pharmacist with any further questions or concerns prior to next appointment.

## 2024-08-15 PROCEDURE — RXMED WILLOW AMBULATORY MEDICATION CHARGE

## 2024-08-16 ENCOUNTER — PHARMACY VISIT (OUTPATIENT)
Dept: PHARMACY | Facility: CLINIC | Age: 44
End: 2024-08-16
Payer: COMMERCIAL

## 2024-09-12 DIAGNOSIS — E66.09 CLASS 1 OBESITY DUE TO EXCESS CALORIES WITHOUT SERIOUS COMORBIDITY WITH BODY MASS INDEX (BMI) OF 31.0 TO 31.9 IN ADULT: ICD-10-CM

## 2024-09-13 RX ORDER — TIRZEPATIDE 2.5 MG/.5ML
2.5 INJECTION, SOLUTION SUBCUTANEOUS
Qty: 2 ML | Refills: 0 | OUTPATIENT
Start: 2024-09-15

## 2024-09-19 ENCOUNTER — APPOINTMENT (OUTPATIENT)
Dept: PHARMACY | Facility: HOSPITAL | Age: 44
End: 2024-09-19
Payer: COMMERCIAL

## 2024-09-19 DIAGNOSIS — E66.09 CLASS 1 OBESITY DUE TO EXCESS CALORIES WITHOUT SERIOUS COMORBIDITY WITH BODY MASS INDEX (BMI) OF 31.0 TO 31.9 IN ADULT: ICD-10-CM

## 2024-09-19 PROCEDURE — RXMED WILLOW AMBULATORY MEDICATION CHARGE

## 2024-09-19 RX ORDER — TIRZEPATIDE 2.5 MG/.5ML
2.5 INJECTION, SOLUTION SUBCUTANEOUS
Qty: 2 ML | Refills: 0 | Status: SHIPPED | OUTPATIENT
Start: 2024-09-22

## 2024-09-19 NOTE — PROGRESS NOTES
Clinical Pharmacy Appointment    Patient ID: Sanyd Nguyen is a 43 y.o. female who presents for Weight Management.    Pt is here for Follow Up appointment.     Referring Provider: Ruben Ruiz MD  PCP: Ruben Ruiz MD   Last visit with PCP: 7/10/2024   Next visit with PCP: 9/25/2024      Subjective     Interval History  No issues since resuming the Mounjaro 2.5 mg weekly dose, feeling well and continuing to lose weight.    HPI  WEIGHT LOSS  PMH significant for HTN/HLD, obesity, PCOS, osteoarthritis.  Special needs/barriers to therapy:  history of intolerance to higher doses of Mounjaro; cost    BMI Readings from Last 1 Encounters:   07/10/24 28.49 kg/m²     Current weight: 157lbs (9/19/2024)    Lab Results   Component Value Date    HGBA1C 5.1 03/27/2024    GLUCOSE 142 (H) 04/21/2024   Hyperglycemia: Denies signs and symptoms    Lifestyle  Denies recent changes to diet or exercise.    Other Potential Contributing Factors  Medications that may cause weight gain:  citalopram  Mental health: No current concerns  Eating Disorders: Denies Hx of any eating disorder  Comorbidities: dyslipidemias, hypertension, and osteoarthritis    Non-Pharmacological Therapy  Weight loss techniques attempted:  Self-directed dieting: and self-directed exercise.    Pharmacological Therapy  Current Medications: Mounjaro 2.5 mg subQ once weekly (Saturday)  Previous Medications: Mounjaro 5 mg weekly- N/V     Adherence: Takes medication as directed and reports no missed doses  Adverse Effects: currently tolerating well    Insurance coverage of weight-loss medications? Yes, only Mounjaro     Preventative Care  ASCVD Risk:   The 10-year ASCVD risk score (Reba DÍAZ, et al., 2019) is: 2.6%    Values used to calculate the score:      Age: 43 years      Sex: Female      Is Non- : No      Diabetic: No      Tobacco smoker: Yes      Systolic Blood Pressure: 128 mmHg      Is BP treated: Yes      HDL Cholesterol: 48  mg/dL      Total Cholesterol: 141 mg/dL  On Statin?: Yes, atorvastatin 20 mg    Immunizations Needed: Tdap    Medication Reconciliation:  No changes made    Drug Interactions  No relevant drug interactions were noted.    Medication System Management  Patient's preferred pharmacy: [facility]  Adherence/Organization: appropriate  Affordability/Accessibility: no issues with Mounjaro      Objective   Allergies   Allergen Reactions    Penicillins Hives     Social History     Social History Narrative    Not on file      Medication Review  Current Outpatient Medications   Medication Instructions    atorvastatin (LIPITOR) 20 mg, oral, Daily    citalopram (CELEXA) 20 mg, oral, Daily    clindamycin (Cleocin T) 1 % lotion Topical, 2 times daily    lisinopril 5 mg, oral, Daily    metoprolol succinate XL (TOPROL-XL) 12.5 mg, oral, Daily, Do not crush or chew.    [START ON 9/22/2024] Mounjaro 2.5 mg, subcutaneous, Once Weekly    multivitamin with minerals (multivitamin-iron-folic acid) tablet 1 tablet, oral, Daily    phentermine (ADIPEX-P) 37.5 mg, oral, Daily before breakfast    promethazine (PHENERGAN) 25 mg, rectal, Every 6 hours PRN    triamcinolone (Kenalog) 0.1 % cream apply topically to hands 1 to 2 times daily as directed until resolved    vit C-Zn gluc-herbal no.325 (Elderberry Zinc Vit C) 90-15 mg lozenge oral      Vitals  BP Readings from Last 2 Encounters:   07/10/24 128/74   05/08/24 132/84     BMI Readings from Last 1 Encounters:   07/10/24 28.49 kg/m²      Labs  A1C  Lab Results   Component Value Date    HGBA1C 5.1 03/27/2024     BMP  Lab Results   Component Value Date    CALCIUM 9.1 04/21/2024     04/21/2024    K 3.7 04/21/2024    CO2 21 04/21/2024     04/21/2024    BUN 15 04/21/2024    CREATININE 0.66 04/21/2024    EGFR >90 04/21/2024     LFTs  Lab Results   Component Value Date    ALT 19 04/21/2024    AST 21 04/21/2024    ALKPHOS 87 04/21/2024    BILITOT 0.8 04/21/2024     FLP  Lab Results  "  Component Value Date    TRIG 65 03/27/2024    CHOL 141 03/27/2024    LDLF 114 (H) 05/03/2023    LDLCALC 80 03/27/2024    HDL 48.0 03/27/2024     Urine Microalbumin  No results found for: \"MICROALBCREA\"  Weight Management  Wt Readings from Last 3 Encounters:   07/10/24 75.3 kg (166 lb)   05/08/24 79.8 kg (176 lb)   04/21/24 81.6 kg (180 lb)      There is no height or weight on file to calculate BMI.     Assessment/Plan   Problem List Items Addressed This Visit       Class 1 obesity due to excess calories without serious comorbidity with body mass index (BMI) of 31.0 to 31.9 in adult     Current regimen includes Mounjaro 2.5 mg weekly. Has lost 30 lbs since starting therapy plan. Due to continued progress on Mounjaro 2.5 mg, plan to continue current therapy and follow-up in 6 weeks.    Medication Changes:  CONTINUE  Mounjaro 2.5 mg once weekly. Will send to [facility] for mail order.    Monitoring and Education:  All questions and concerns addressed. Contact pharmacist with any further questions or concerns prior to next appointment.     Mounjaro Education:  Counseled patient on Mounjaro MOA, expectations, side effects, duration of therapy, administration, and monitoring parameters.  Counseled patient on the benefits of GLP-1ra, such as cardiovascular risk reduction, glycemic control, and weight loss potential.  Provided detailed dosing and administration counseling to ensure proper technique.   Reviewed Mounjaro titration schedule, starting with 2.5 mg once weekly to 5 mg, 7.5mg, 10mg, 12.5mg and if tolerated 15 mg.  Reviewed storage requirements of Mounjaro when not in use, and when to administer the medication if a dose is missed.  Discussed risks of GLP1ra including risk of pancreatitis, MTC and worsening of DR  Advised patient that they may experience improved satiety after meals and portion sizes of meals may be reduced as doses of Mounjaro increase.         Relevant Medications    tirzepatide " (Mounjaro) 2.5 mg/0.5 mL pen injector (Start on 9/22/2024)    Other Relevant Orders    Follow Up In [facility] - Pharmacy       Clinical Pharmacist follow-up: 9/19 4:45 PM, Telehealth visit    Continue all meds under the continuation of care with the referring provider and clinical pharmacy team.    Thank you,  Sultana Galeana, PharmD  Clinical Pharmacist  [phone]    Verbal consent to manage patient's drug therapy was obtained from the patient. They were informed they may decline to participate or withdraw from participation in pharmacy services at any time.

## 2024-09-19 NOTE — ASSESSMENT & PLAN NOTE
Current regimen includes Mounjaro 2.5 mg weekly. Has lost 30 lbs since starting therapy plan. Due to continued progress on Mounjaro 2.5 mg, plan to continue current therapy and follow-up in 6 weeks.    Medication Changes:  CONTINUE  Mounjaro 2.5 mg once weekly. Will send to [facility] for mail order.    Monitoring and Education:  All questions and concerns addressed. Contact pharmacist with any further questions or concerns prior to next appointment.     Mounjaro Education:  Counseled patient on Mounjaro MOA, expectations, side effects, duration of therapy, administration, and monitoring parameters.  Counseled patient on the benefits of GLP-1ra, such as cardiovascular risk reduction, glycemic control, and weight loss potential.  Provided detailed dosing and administration counseling to ensure proper technique.   Reviewed Mounjaro titration schedule, starting with 2.5 mg once weekly to 5 mg, 7.5mg, 10mg, 12.5mg and if tolerated 15 mg.  Reviewed storage requirements of Mounjaro when not in use, and when to administer the medication if a dose is missed.  Discussed risks of GLP1ra including risk of pancreatitis, MTC and worsening of DR  Advised patient that they may experience improved satiety after meals and portion sizes of meals may be reduced as doses of Mounjaro increase.

## 2024-09-20 ENCOUNTER — PHARMACY VISIT (OUTPATIENT)
Dept: PHARMACY | Facility: CLINIC | Age: 44
End: 2024-09-20
Payer: COMMERCIAL

## 2024-09-25 ENCOUNTER — APPOINTMENT (OUTPATIENT)
Dept: PRIMARY CARE | Facility: CLINIC | Age: 44
End: 2024-09-25
Payer: COMMERCIAL

## 2024-09-25 ENCOUNTER — TELEPHONE (OUTPATIENT)
Dept: PRIMARY CARE | Facility: CLINIC | Age: 44
End: 2024-09-25

## 2024-09-25 VITALS
HEART RATE: 96 BPM | BODY MASS INDEX: 26.84 KG/M2 | DIASTOLIC BLOOD PRESSURE: 70 MMHG | TEMPERATURE: 97.9 F | WEIGHT: 157.2 LBS | OXYGEN SATURATION: 98 % | HEIGHT: 64 IN | RESPIRATION RATE: 16 BRPM | SYSTOLIC BLOOD PRESSURE: 114 MMHG

## 2024-09-25 DIAGNOSIS — Z23 NEED FOR INFLUENZA VACCINATION: ICD-10-CM

## 2024-09-25 DIAGNOSIS — E53.8 VITAMIN B 12 DEFICIENCY: ICD-10-CM

## 2024-09-25 DIAGNOSIS — M19.90 OSTEOARTHRITIS, UNSPECIFIED OSTEOARTHRITIS TYPE, UNSPECIFIED SITE: ICD-10-CM

## 2024-09-25 DIAGNOSIS — E66.09 CLASS 1 OBESITY DUE TO EXCESS CALORIES WITHOUT SERIOUS COMORBIDITY WITH BODY MASS INDEX (BMI) OF 31.0 TO 31.9 IN ADULT: ICD-10-CM

## 2024-09-25 DIAGNOSIS — R53.83 FATIGUE, UNSPECIFIED TYPE: ICD-10-CM

## 2024-09-25 DIAGNOSIS — F32.9 REACTIVE DEPRESSION: ICD-10-CM

## 2024-09-25 DIAGNOSIS — L03.115 CELLULITIS OF RIGHT LOWER EXTREMITY: Primary | ICD-10-CM

## 2024-09-25 DIAGNOSIS — R10.12 LEFT UPPER QUADRANT ABDOMINAL PAIN: ICD-10-CM

## 2024-09-25 DIAGNOSIS — L30.9 ECZEMA, UNSPECIFIED TYPE: ICD-10-CM

## 2024-09-25 DIAGNOSIS — E78.00 PURE HYPERCHOLESTEROLEMIA: ICD-10-CM

## 2024-09-25 DIAGNOSIS — E78.41 ELEVATED LIPOPROTEIN(A): ICD-10-CM

## 2024-09-25 DIAGNOSIS — E55.9 VITAMIN D DEFICIENCY: ICD-10-CM

## 2024-09-25 DIAGNOSIS — E28.2 PCOS (POLYCYSTIC OVARIAN SYNDROME): ICD-10-CM

## 2024-09-25 DIAGNOSIS — I10 PRIMARY HYPERTENSION: ICD-10-CM

## 2024-09-25 DIAGNOSIS — I49.3 FREQUENT PVCS: ICD-10-CM

## 2024-09-25 PROCEDURE — 90471 IMMUNIZATION ADMIN: CPT | Performed by: FAMILY MEDICINE

## 2024-09-25 PROCEDURE — 90656 IIV3 VACC NO PRSV 0.5 ML IM: CPT | Performed by: FAMILY MEDICINE

## 2024-09-25 PROCEDURE — 99214 OFFICE O/P EST MOD 30 MIN: CPT | Performed by: FAMILY MEDICINE

## 2024-09-25 RX ORDER — FLUCONAZOLE 150 MG/1
150 TABLET ORAL EVERY OTHER DAY
Qty: 2 TABLET | Refills: 5 | Status: SHIPPED | OUTPATIENT
Start: 2024-09-25 | End: 2024-10-19

## 2024-09-25 RX ORDER — TIRZEPATIDE 2.5 MG/.5ML
2.5 INJECTION, SOLUTION SUBCUTANEOUS
Qty: 2 ML | Refills: 1 | Status: SHIPPED | OUTPATIENT
Start: 2024-09-29

## 2024-09-25 RX ORDER — LISINOPRIL 5 MG/1
5 TABLET ORAL DAILY
Qty: 90 TABLET | Refills: 1 | Status: SHIPPED | OUTPATIENT
Start: 2024-09-25

## 2024-09-25 RX ORDER — LISINOPRIL 5 MG/1
5 TABLET ORAL DAILY
Qty: 90 TABLET | Refills: 0 | Status: SHIPPED | OUTPATIENT
Start: 2024-09-25 | End: 2024-09-25 | Stop reason: SDUPTHER

## 2024-09-25 RX ORDER — CITALOPRAM 20 MG/1
20 TABLET, FILM COATED ORAL DAILY
Qty: 90 TABLET | Refills: 0 | Status: SHIPPED | OUTPATIENT
Start: 2024-09-25

## 2024-09-25 RX ORDER — CEFDINIR 300 MG/1
300 CAPSULE ORAL 2 TIMES DAILY
Qty: 20 CAPSULE | Refills: 0 | Status: SHIPPED | OUTPATIENT
Start: 2024-09-25 | End: 2024-10-05

## 2024-09-25 ASSESSMENT — ENCOUNTER SYMPTOMS
HYPERACTIVE: 0
EYE DISCHARGE: 0
GASTROINTESTINAL NEGATIVE: 1
VOMITING: 0
ADENOPATHY: 0
PHOTOPHOBIA: 0
BLOOD IN STOOL: 0
NAUSEA: 0
CARDIOVASCULAR NEGATIVE: 1
FLANK PAIN: 0
EYE PAIN: 0
SORE THROAT: 0
MYALGIAS: 0
WOUND: 0
STRIDOR: 0
SEIZURES: 0
CHEST TIGHTNESS: 0
FEVER: 0
WEAKNESS: 0
VOICE CHANGE: 0
CHILLS: 0
NECK STIFFNESS: 0
DYSURIA: 0
RECTAL PAIN: 0
JOINT SWELLING: 0
FATIGUE: 0
SINUS PRESSURE: 0
FREQUENCY: 0
WHEEZING: 0
POLYDIPSIA: 0
EYE ITCHING: 0
DIZZINESS: 0
TREMORS: 0
DIAPHORESIS: 0
HEMATURIA: 0
DECREASED CONCENTRATION: 0
EYE REDNESS: 0
TROUBLE SWALLOWING: 0
DYSPHORIC MOOD: 0
ANAL BLEEDING: 0
HALLUCINATIONS: 0
PALPITATIONS: 0
CHOKING: 0
ABDOMINAL PAIN: 0
FACIAL ASYMMETRY: 0
ABDOMINAL DISTENTION: 0
COUGH: 0
ACTIVITY CHANGE: 0
BACK PAIN: 0
CONFUSION: 0
HEADACHES: 0
DIARRHEA: 0
NUMBNESS: 0
SPEECH DIFFICULTY: 0
APNEA: 0
LIGHT-HEADEDNESS: 0
APPETITE CHANGE: 0
POLYPHAGIA: 0
FACIAL SWELLING: 0
COLOR CHANGE: 0
CONSTIPATION: 0
NECK PAIN: 0
RHINORRHEA: 0
BRUISES/BLEEDS EASILY: 0
SHORTNESS OF BREATH: 0
UNEXPECTED WEIGHT CHANGE: 0
DIFFICULTY URINATING: 0
NERVOUS/ANXIOUS: 0
AGITATION: 0
SINUS PAIN: 0
CONSTITUTIONAL NEGATIVE: 1
ARTHRALGIAS: 0
SLEEP DISTURBANCE: 0

## 2024-09-25 NOTE — TELEPHONE ENCOUNTER
[id number] - [place], [place] - [address]  [address] [address]  Phone: [phone]   PLEASE CALL PHARMACY TO CLARIFY DIRECTIONS/SCRIPT ON   fluconazole (Diflucan) 150 mg tablet

## 2024-09-25 NOTE — PROGRESS NOTES
Subjective   Patient ID: Sandy Nguyen is a 43 y.o. female who presents for Obesity.    HPI   The patient is in office to go over her mounjaro medication. She states it has been working well for her. The patient has lost 9 lb since her last in office visit.   Review of Systems   Constitutional: Negative.  Negative for activity change, appetite change, chills, diaphoresis, fatigue, fever and unexpected weight change.   HENT: Negative.  Negative for congestion, dental problem, ear discharge, facial swelling, hearing loss, mouth sores, nosebleeds, postnasal drip, rhinorrhea, sinus pressure, sinus pain, sneezing, sore throat, tinnitus, trouble swallowing and voice change.    Eyes:  Negative for photophobia, pain, discharge, redness, itching and visual disturbance.   Respiratory:  Negative for apnea, cough, choking, chest tightness, shortness of breath, wheezing and stridor.    Cardiovascular: Negative.  Negative for chest pain, palpitations and leg swelling.   Gastrointestinal: Negative.  Negative for abdominal distention, abdominal pain, anal bleeding, blood in stool, constipation, diarrhea, nausea, rectal pain and vomiting.   Endocrine: Negative for cold intolerance, heat intolerance, polydipsia, polyphagia and polyuria.   Genitourinary:  Negative for decreased urine volume, difficulty urinating, dysuria, enuresis, flank pain, frequency, hematuria and urgency.   Musculoskeletal:  Negative for arthralgias, back pain, gait problem, joint swelling, myalgias, neck pain and neck stiffness.   Skin:  Negative for color change, pallor, rash and wound.   Allergic/Immunologic: Negative for environmental allergies, food allergies and immunocompromised state.   Neurological:  Negative for dizziness, tremors, seizures, syncope, facial asymmetry, speech difficulty, weakness, light-headedness, numbness and headaches.   Hematological:  Negative for adenopathy. Does not bruise/bleed easily.   Psychiatric/Behavioral:  Negative for  "agitation, behavioral problems, confusion, decreased concentration, dysphoric mood, hallucinations, self-injury, sleep disturbance and suicidal ideas. The patient is not nervous/anxious and is not hyperactive.    All other systems reviewed and are negative.      Objective   /70 (BP Location: Left arm, Patient Position: Sitting, BP Cuff Size: Adult)   Pulse 96   Temp 36.6 °C (97.9 °F) (Temporal)   Resp 16   Ht 1.626 m (5' 4\")   Wt 71.3 kg (157 lb 3.2 oz)   SpO2 98%   BMI 26.98 kg/m²     Physical Exam  Vitals reviewed.   Constitutional:       General: She is not in acute distress.     Appearance: Normal appearance. She is normal weight. She is not ill-appearing or diaphoretic.   HENT:      Head: Normocephalic.      Right Ear: Tympanic membrane and external ear normal.      Left Ear: Tympanic membrane and external ear normal.      Nose: Nose normal. No congestion.      Mouth/Throat:      Pharynx: No posterior oropharyngeal erythema.   Eyes:      General:         Right eye: No discharge.         Left eye: No discharge.      Extraocular Movements: Extraocular movements intact.      Conjunctiva/sclera: Conjunctivae normal.      Pupils: Pupils are equal, round, and reactive to light.   Cardiovascular:      Rate and Rhythm: Normal rate and regular rhythm.      Pulses: Normal pulses.      Heart sounds: Normal heart sounds. No murmur heard.  Pulmonary:      Effort: Pulmonary effort is normal. No respiratory distress.      Breath sounds: Normal breath sounds. No wheezing or rales.   Chest:      Chest wall: No tenderness.   Abdominal:      General: Abdomen is flat. Bowel sounds are normal. There is no distension.      Palpations: There is no mass.      Tenderness: There is no abdominal tenderness. There is no guarding.   Musculoskeletal:         General: No tenderness. Normal range of motion.      Cervical back: Normal range of motion and neck supple. No tenderness.      Right lower leg: No edema.      Left lower " leg: No edema.   Skin:     General: Skin is dry.      Coloration: Skin is not jaundiced.      Findings: No bruising, erythema or rash.   Neurological:      General: No focal deficit present.      Mental Status: She is alert and oriented to person, place, and time. Mental status is at baseline.      Cranial Nerves: No cranial nerve deficit.      Sensory: No sensory deficit.      Coordination: Coordination normal.      Gait: Gait normal.   Psychiatric:         Mood and Affect: Mood normal.         Thought Content: Thought content normal.         Judgment: Judgment normal.         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    Relevant Orders    Follow Up In [facility] - PCP - Established    Follow Up In [facility] - PCP - Established    Eczema L30.9    Relevant Orders    Follow Up In [facility] - PCP - Established    Follow Up In [facility] - PCP - Established    PCOS (polycystic ovarian syndrome) E28.2    Relevant Orders    Follow Up In [facility] - PCP - Established    Follow Up In [facility] - PCP - Established    Pure hypercholesterolemia E78.00    Relevant Orders    Follow Up In [facility] - PCP - Established    Follow Up In [facility] - PCP - Established    Vitamin B 12 deficiency E53.8    Relevant Orders    Follow Up In [facility] - PCP - Established    Follow Up In [facility] - PCP - Established    Class 1 obesity due to excess calories without serious comorbidity with body mass index (BMI) of 31.0 to 31.9 in adult E66.09, Z68.31    Relevant Medications    tirzepatide (Mounjaro) 2.5 mg/0.5 mL pen injector (Start on 9/29/2024)     Other Relevant Orders    Follow Up In [facility] - PCP - Established    Follow Up In [facility] - PCP - Established    Osteoarthrosis M19.90    Relevant Orders    Follow Up In [facility] - PCP - Established    Follow Up In [facility] - PCP - Established    Fatigue R53.83    Relevant Orders    Follow Up In [facility] - PCP - Established    Follow Up In [facility] - PCP - Established    Vitamin D deficiency E55.9    Relevant Orders    Follow Up In [facility] - PCP - Established    Follow Up In [facility] - PCP - Established     Other Visit Diagnoses         Codes    Cellulitis of right lower extremity    -  Primary L03.115    Relevant Medications    cefdinir (Omnicef) 300 mg capsule    fluconazole (Diflucan) 150 mg tablet    Other Relevant Orders    Follow Up In [facility] - PCP - Established    Left upper quadrant abdominal pain     R10.12    Relevant Orders    Follow Up In [facility] - PCP - Established    Follow Up In [facility] - PCP - Established    Frequent PVCs     I49.3    Relevant Orders    Follow Up In [facility] - PCP - Established    Follow Up In [facility] - PCP - Established    Need for influenza vaccination     Z23    Relevant Orders    Flu vaccine, trivalent, preservative free, age 6 months and greater (Fluarix/Fluzone/Flulaval) (Completed)    Follow Up In [facility] - PCP - Established    Follow Up In [facility] - PCP - Established          Scribe Attestation  By signing my name below, I, Luis Otero MA   attest that this documentation has been prepared under the direction and in the presence of Ruben Ruiz MD.    Provider Attestation - Scribe documentation    All medical record entries made by the Scribe were at my direction and personally dictated by me. I have reviewed the chart and agree that the record accurately  reflects my personal performance of the history, physical exam, discussion and plan.    Continue current medications and therapy for chronic medical conditions    Cont. Mounjaro 2.5 mg weekly     -Discuss humira for hidradenitis in the future      -All dx improved     Patient was advised importance of proper diet/nutrition in addition adequate hydration. Patient was encouraged moderate exercise program to include 30 minutes daily for 5 days of the week or 150 minutes weekly. Patient will follow-up with us as scheduled.

## 2024-10-01 DIAGNOSIS — E78.00 PURE HYPERCHOLESTEROLEMIA: ICD-10-CM

## 2024-10-01 RX ORDER — ATORVASTATIN CALCIUM 20 MG/1
20 TABLET, FILM COATED ORAL DAILY
Qty: 90 TABLET | Refills: 0 | Status: SHIPPED | OUTPATIENT
Start: 2024-10-01

## 2024-10-10 ENCOUNTER — APPOINTMENT (OUTPATIENT)
Dept: PHARMACY | Facility: HOSPITAL | Age: 44
End: 2024-10-10
Payer: COMMERCIAL

## 2024-10-10 DIAGNOSIS — E66.09 CLASS 1 OBESITY DUE TO EXCESS CALORIES WITHOUT SERIOUS COMORBIDITY WITH BODY MASS INDEX (BMI) OF 31.0 TO 31.9 IN ADULT: ICD-10-CM

## 2024-10-10 DIAGNOSIS — E66.811 CLASS 1 OBESITY DUE TO EXCESS CALORIES WITHOUT SERIOUS COMORBIDITY WITH BODY MASS INDEX (BMI) OF 31.0 TO 31.9 IN ADULT: ICD-10-CM

## 2024-10-10 NOTE — PROGRESS NOTES
Clinical Pharmacy Appointment    Patient ID: Sandy Nguyen is a 43 y.o. female who presents for Weight Management.    Pt is here for Follow Up appointment.     Referring Provider: Ruben Ruiz MD  PCP: Ruben Ruiz MD   Last visit with PCP: 7/10/2024   Next visit with PCP: 9/25/2024      Subjective     Interval History  No issues since resuming the Mounjaro 2.5 mg weekly dose, feeling well and continuing to lose weight.    HPI  WEIGHT LOSS  PMH significant for HTN/HLD, obesity, PCOS, osteoarthritis.  Special needs/barriers to therapy:  history of intolerance to higher doses of Mounjaro; cost    BMI Readings from Last 1 Encounters:   09/25/24 26.98 kg/m²     Current weight: 157lbs (9/19/2024)    Lab Results   Component Value Date    HGBA1C 5.1 03/27/2024    GLUCOSE 142 (H) 04/21/2024   Hyperglycemia: Denies signs and symptoms    Lifestyle  Denies recent changes to diet or exercise.    Other Potential Contributing Factors  Medications that may cause weight gain:  citalopram  Mental health: No current concerns  Eating Disorders: Denies Hx of any eating disorder  Comorbidities: dyslipidemias, hypertension, and osteoarthritis    Non-Pharmacological Therapy  Weight loss techniques attempted:  Self-directed dieting: and self-directed exercise.    Pharmacological Therapy  Current Medications: Mounjaro 2.5 mg subQ once weekly (Saturday)  Previous Medications: Mounjaro 5 mg weekly- N/V     Adherence: Takes medication as directed and reports no missed doses  Adverse Effects: currently tolerating well    Insurance coverage of weight-loss medications? Yes, only Mounjaro     Preventative Care  ASCVD Risk:   The 10-year ASCVD risk score (Reba DÍAZ, et al., 2019) is: 2%    Values used to calculate the score:      Age: 43 years      Sex: Female      Is Non- : No      Diabetic: No      Tobacco smoker: Yes      Systolic Blood Pressure: 114 mmHg      Is BP treated: Yes      HDL Cholesterol: 48  mg/dL      Total Cholesterol: 141 mg/dL  On Statin?: Yes, atorvastatin 20 mg    Immunizations Needed: Tdap    Medication Reconciliation:  No changes made    Drug Interactions  No relevant drug interactions were noted.    Medication System Management  Patient's preferred pharmacy: [facility]  Adherence/Organization: appropriate  Affordability/Accessibility: no issues with Mounjaro      Objective   Allergies   Allergen Reactions    Penicillins Hives     Social History     Social History Narrative    Not on file      Medication Review  Current Outpatient Medications   Medication Instructions    atorvastatin (LIPITOR) 20 mg, oral, Daily    citalopram (CELEXA) 20 mg, oral, Daily    clindamycin (Cleocin T) 1 % lotion Topical, 2 times daily    fluconazole (DIFLUCAN) 150 mg, oral, Every other day    lisinopril 5 mg, oral, Daily    metoprolol succinate XL (TOPROL-XL) 12.5 mg, oral, Daily, Do not crush or chew.    Mounjaro 2.5 mg, subcutaneous, Once Weekly    multivitamin with minerals (multivitamin-iron-folic acid) tablet 1 tablet, oral, Daily    promethazine (PHENERGAN) 25 mg, rectal, Every 6 hours PRN    triamcinolone (Kenalog) 0.1 % cream apply topically to hands 1 to 2 times daily as directed until resolved    vit C-Zn gluc-herbal no.325 (Elderberry Zinc Vit C) 90-15 mg lozenge oral      Vitals  BP Readings from Last 2 Encounters:   09/25/24 114/70   07/10/24 128/74     BMI Readings from Last 1 Encounters:   09/25/24 26.98 kg/m²      Labs  A1C  Lab Results   Component Value Date    HGBA1C 5.1 03/27/2024     BMP  Lab Results   Component Value Date    CALCIUM 9.1 04/21/2024     04/21/2024    K 3.7 04/21/2024    CO2 21 04/21/2024     04/21/2024    BUN 15 04/21/2024    CREATININE 0.66 04/21/2024    EGFR >90 04/21/2024     LFTs  Lab Results   Component Value Date    ALT 19 04/21/2024    AST 21 04/21/2024    ALKPHOS 87 04/21/2024    BILITOT 0.8 04/21/2024     FLP  Lab Results   Component Value Date    TRIG 65  "03/27/2024    CHOL 141 03/27/2024    LDLF 114 (H) 05/03/2023    LDLCALC 80 03/27/2024    HDL 48.0 03/27/2024     Urine Microalbumin  No results found for: \"MICROALBCREA\"  Weight Management  Wt Readings from Last 3 Encounters:   09/25/24 71.3 kg (157 lb 3.2 oz)   07/10/24 75.3 kg (166 lb)   05/08/24 79.8 kg (176 lb)      There is no height or weight on file to calculate BMI.     Assessment/Plan   Problem List Items Addressed This Visit       Class 1 obesity due to excess calories without serious comorbidity with body mass index (BMI) of 31.0 to 31.9 in adult     Current regimen includes Mounjaro 2.5 mg weekly. Has lost about 33 lbs since starting therapy plan. Due to continued progress on Mounjaro 2.5 mg, plan to continue current therapy. When patient has tried to increase Mounjaro in the past, she has not tolerated and had nausea/vomiting. Patient would like to try to increase Mounjaro to help with additional weight loss, but will continue on current therapy. Patient does have 5mg dose at home and may try to increase if she is feeling okay with the 2.5mg dose.    Medication Changes:  CONTINUE  Mounjaro 2.5 mg once weekly. Will send to [facility] for mail order.    Monitoring and Education:  All questions and concerns addressed. Contact pharmacist with any further questions or concerns prior to next appointment.     Mounjaro Education:  Counseled patient on Mounjaro MOA, expectations, side effects, duration of therapy, administration, and monitoring parameters.  Counseled patient on the benefits of GLP-1ra, such as cardiovascular risk reduction, glycemic control, and weight loss potential.  Provided detailed dosing and administration counseling to ensure proper technique.   Reviewed Mounjaro titration schedule, starting with 2.5 mg once weekly to 5 mg, 7.5mg, 10mg, 12.5mg and if tolerated 15 mg.  Reviewed storage requirements of Mounjaro when not in use, and when to administer the medication if a dose is " missed.  Discussed risks of GLP1ra including risk of pancreatitis, MTC and worsening of DR  Advised patient that they may experience improved satiety after meals and portion sizes of meals may be reduced as doses of Mounjaro increase.         Relevant Orders    Referral to Clinical Pharmacy       Clinical Pharmacist follow-up: 11/7/2024 at 4:45 PM, Telehealth visit    Continue all meds under the continuation of care with the referring provider and clinical pharmacy team.    Thank you,  Sultana Galeana, PharmD  Clinical Pharmacist  [phone]    Verbal consent to manage patient's drug therapy was obtained from the patient. They were informed they may decline to participate or withdraw from participation in pharmacy services at any time.

## 2024-10-11 PROCEDURE — RXMED WILLOW AMBULATORY MEDICATION CHARGE

## 2024-10-14 ENCOUNTER — PHARMACY VISIT (OUTPATIENT)
Dept: PHARMACY | Facility: CLINIC | Age: 44
End: 2024-10-14
Payer: COMMERCIAL

## 2024-10-14 NOTE — ASSESSMENT & PLAN NOTE
Current regimen includes Mounjaro 2.5 mg weekly. Has lost about 33 lbs since starting therapy plan. Due to continued progress on Mounjaro 2.5 mg, plan to continue current therapy. When patient has tried to increase Mounjaro in the past, she has not tolerated and had nausea/vomiting. Patient would like to try to increase Mounjaro to help with additional weight loss, but will continue on current therapy. Patient does have 5mg dose at home and may try to increase if she is feeling okay with the 2.5mg dose.    Medication Changes:  CONTINUE  Mounjaro 2.5 mg once weekly. Will send to [facility] for mail order.    Monitoring and Education:  All questions and concerns addressed. Contact pharmacist with any further questions or concerns prior to next appointment.     Mounjaro Education:  Counseled patient on Mounjaro MOA, expectations, side effects, duration of therapy, administration, and monitoring parameters.  Counseled patient on the benefits of GLP-1ra, such as cardiovascular risk reduction, glycemic control, and weight loss potential.  Provided detailed dosing and administration counseling to ensure proper technique.   Reviewed Mounjaro titration schedule, starting with 2.5 mg once weekly to 5 mg, 7.5mg, 10mg, 12.5mg and if tolerated 15 mg.  Reviewed storage requirements of Mounjaro when not in use, and when to administer the medication if a dose is missed.  Discussed risks of GLP1ra including risk of pancreatitis, MTC and worsening of DR  Advised patient that they may experience improved satiety after meals and portion sizes of meals may be reduced as doses of Mounjaro increase.

## 2024-11-04 ENCOUNTER — TELEPHONE (OUTPATIENT)
Dept: PRIMARY CARE | Facility: CLINIC | Age: 44
End: 2024-11-04
Payer: COMMERCIAL

## 2024-11-04 NOTE — TELEPHONE ENCOUNTER
Dr. Ruiz Pt    Pt is calling to request that CB please call in a stronger antibiotic for her HS flare up. The last antibiotic that was called in was not strong enough. Please advise, thank you.      [id number] - [facility], [place] - [address] DANIEL Delgado  [phone]

## 2024-11-06 ENCOUNTER — DOCUMENTATION (OUTPATIENT)
Dept: PRIMARY CARE | Facility: CLINIC | Age: 44
End: 2024-11-06
Payer: COMMERCIAL

## 2024-11-06 DIAGNOSIS — L03.90 ACUTE CELLULITIS: ICD-10-CM

## 2024-11-06 RX ORDER — LEVOFLOXACIN 500 MG/1
500 TABLET, FILM COATED ORAL DAILY
Qty: 10 TABLET | Refills: 0 | Status: SHIPPED | OUTPATIENT
Start: 2024-11-06 | End: 2024-11-16

## 2024-11-07 ENCOUNTER — APPOINTMENT (OUTPATIENT)
Dept: PHARMACY | Facility: HOSPITAL | Age: 44
End: 2024-11-07
Payer: COMMERCIAL

## 2024-11-07 DIAGNOSIS — I10 PRIMARY HYPERTENSION: ICD-10-CM

## 2024-11-07 DIAGNOSIS — E66.09 CLASS 1 OBESITY DUE TO EXCESS CALORIES WITHOUT SERIOUS COMORBIDITY WITH BODY MASS INDEX (BMI) OF 31.0 TO 31.9 IN ADULT: ICD-10-CM

## 2024-11-07 DIAGNOSIS — E66.811 CLASS 1 OBESITY DUE TO EXCESS CALORIES WITHOUT SERIOUS COMORBIDITY WITH BODY MASS INDEX (BMI) OF 31.0 TO 31.9 IN ADULT: ICD-10-CM

## 2024-11-07 DIAGNOSIS — I10 PRIMARY HYPERTENSION: Primary | ICD-10-CM

## 2024-11-07 PROCEDURE — RXMED WILLOW AMBULATORY MEDICATION CHARGE

## 2024-11-07 RX ORDER — TIRZEPATIDE 2.5 MG/.5ML
2.5 INJECTION, SOLUTION SUBCUTANEOUS
Qty: 2 ML | Refills: 0 | Status: SHIPPED | OUTPATIENT
Start: 2024-11-10

## 2024-11-07 NOTE — TELEPHONE ENCOUNTER
Dr. Ruiz patient    Patient says that when she takes her blood pressure medication she experiences dizziness and heart palpitations. Patient says that she did not take her medication today and she feels fine.    Patient would like to know what CB thinks she should do.    Please advise, thank you.

## 2024-11-07 NOTE — ASSESSMENT & PLAN NOTE
Patient states she has been feeling symptoms of low blood pressure (dizzy, lightheaded, etc). She does not have a blood pressure monitor at home, but she did discontinue her Lisinopril 5mg and states she started to feel better.    START taking blood pressures. Discussed with patient to go to a pharmacy and purchase a blood pressure monitor for her arm over the counter. Patient understands and will do this. She can hold her Lisinopril in the meantime and will discuss resuming after she has taken her BP for a few weeks.

## 2024-11-07 NOTE — PROGRESS NOTES
Clinical Pharmacy Appointment    Patient ID: Sandy Nguyen is a 44 y.o. female who presents for Weight Management.    Pt is here for Follow Up appointment.     Referring Provider: Ruben Ruzi MD  PCP: Ruben Ruiz MD   Last visit with PCP: 9/25/2024   Next visit with PCP: 12/18/2024      Subjective     Interval History  Patient is experiencing dizziness and lightheadedness. States she believes it is related to blood pressure, but did not take her blood pressure at home. Stopped Lisinopril for one day and felt better.    HPI  WEIGHT LOSS  PMH significant for HTN/HLD, obesity, PCOS, osteoarthritis.  Special needs/barriers to therapy:  history of intolerance to higher doses of Mounjaro; cost    BMI Readings from Last 1 Encounters:   09/25/24 26.98 kg/m²     Current weight: 157lbs (9/19/2024)    Lab Results   Component Value Date    HGBA1C 5.1 03/27/2024    GLUCOSE 142 (H) 04/21/2024   Hyperglycemia: Denies signs and symptoms    Lifestyle  Denies recent changes to diet or exercise.    Other Potential Contributing Factors  Medications that may cause weight gain:  citalopram  Mental health: No current concerns  Eating Disorders: Denies Hx of any eating disorder  Comorbidities: dyslipidemias, hypertension, and osteoarthritis    Non-Pharmacological Therapy  Weight loss techniques attempted:  Self-directed dieting: and self-directed exercise.    Pharmacological Therapy  Current Medications: Mounjaro 2.5 mg subQ once weekly (Saturday)  Previous Medications: Mounjaro 5 mg weekly- N/V     Adherence: Takes medication as directed and reports no missed doses  Adverse Effects: currently tolerating well    Insurance coverage of weight-loss medications? Yes, only Mounjaro     Preventative Care  ASCVD Risk:   The 10-year ASCVD risk score (Reba DÍAZ, et al., 2019) is: 2.2%    Values used to calculate the score:      Age: 44 years      Sex: Female      Is Non- : No      Diabetic: No      Tobacco  smoker: Yes      Systolic Blood Pressure: 114 mmHg      Is BP treated: Yes      HDL Cholesterol: 48 mg/dL      Total Cholesterol: 141 mg/dL  On Statin?: Yes, atorvastatin 20 mg    Immunizations Needed: Tdap    Medication Reconciliation:  No changes made    Drug Interactions  No relevant drug interactions were noted.    Medication System Management  Patient's preferred pharmacy: [facility]  Adherence/Organization: appropriate  Affordability/Accessibility: no issues with Mounjaro      Objective   Allergies   Allergen Reactions    Penicillins Hives     Social History     Social History Narrative    Not on file      Medication Review  Current Outpatient Medications   Medication Instructions    atorvastatin (LIPITOR) 20 mg, oral, Daily    citalopram (CELEXA) 20 mg, oral, Daily    clindamycin (Cleocin T) 1 % lotion Topical, 2 times daily    levoFLOXacin (LEVAQUIN) 500 mg, oral, Daily    lisinopril 5 mg, oral, Daily    [START ON 11/10/2024] Mounjaro 2.5 mg, subcutaneous, Once Weekly    multivitamin with minerals (multivitamin-iron-folic acid) tablet 1 tablet, oral, Daily    promethazine (PHENERGAN) 25 mg, rectal, Every 6 hours PRN    triamcinolone (Kenalog) 0.1 % cream apply topically to hands 1 to 2 times daily as directed until resolved    vit C-Zn gluc-herbal no.325 (Elderberry Zinc Vit C) 90-15 mg lozenge oral      Vitals  BP Readings from Last 2 Encounters:   09/25/24 114/70   07/10/24 128/74     BMI Readings from Last 1 Encounters:   09/25/24 26.98 kg/m²      Labs  A1C  Lab Results   Component Value Date    HGBA1C 5.1 03/27/2024     BMP  Lab Results   Component Value Date    CALCIUM 9.1 04/21/2024     04/21/2024    K 3.7 04/21/2024    CO2 21 04/21/2024     04/21/2024    BUN 15 04/21/2024    CREATININE 0.66 04/21/2024    EGFR >90 04/21/2024     LFTs  Lab Results   Component Value Date    ALT 19 04/21/2024    AST 21 04/21/2024    ALKPHOS 87 04/21/2024    BILITOT 0.8 04/21/2024     FLP  Lab Results  "  Component Value Date    TRIG 65 03/27/2024    CHOL 141 03/27/2024    LDLF 114 (H) 05/03/2023    LDLCALC 80 03/27/2024    HDL 48.0 03/27/2024     Urine Microalbumin  No results found for: \"MICROALBCREA\"  Weight Management  Wt Readings from Last 3 Encounters:   09/25/24 71.3 kg (157 lb 3.2 oz)   07/10/24 75.3 kg (166 lb)   05/08/24 79.8 kg (176 lb)      There is no height or weight on file to calculate BMI.     Assessment/Plan   Problem List Items Addressed This Visit       Hypertension - Primary     Patient states she has been feeling symptoms of low blood pressure (dizzy, lightheaded, etc). She does not have a blood pressure monitor at home, but she did discontinue her Lisinopril 5mg and states she started to feel better.    START taking blood pressures. Discussed with patient to go to a pharmacy and purchase a blood pressure monitor for her arm over the counter. Patient understands and will do this. She can hold her Lisinopril in the meantime and will discuss resuming after she has taken her BP for a few weeks.         Relevant Orders    Referral to Clinical Pharmacy    Class 1 obesity due to excess calories without serious comorbidity with body mass index (BMI) of 31.0 to 31.9 in adult     Current regimen includes Mounjaro 2.5 mg weekly. Has lost about 35 lbs since starting therapy plan. Due to continued progress on Mounjaro 2.5 mg, plan to continue current therapy. When patient has tried to increase Mounjaro in the past, she has not tolerated and had nausea/vomiting. Patient would like to try to increase Mounjaro to help with additional weight loss, but will continue on current therapy. Patient does have 5mg dose at home and may try to increase if she is feeling okay with the 2.5mg dose.    Medication Changes:  CONTINUE  Mounjaro 2.5 mg once weekly. Will send to [facility] for mail order.    Monitoring and Education:  All questions and concerns addressed. Contact pharmacist with any further questions " or concerns prior to next appointment.     Mounjaro Education:  Counseled patient on Mounjaro MOA, expectations, side effects, duration of therapy, administration, and monitoring parameters.  Counseled patient on the benefits of GLP-1ra, such as cardiovascular risk reduction, glycemic control, and weight loss potential.  Provided detailed dosing and administration counseling to ensure proper technique.   Reviewed Mounjaro titration schedule, starting with 2.5 mg once weekly to 5 mg, 7.5mg, 10mg, 12.5mg and if tolerated 15 mg.  Reviewed storage requirements of Mounjaro when not in use, and when to administer the medication if a dose is missed.  Discussed risks of GLP1ra including risk of pancreatitis, MTC and worsening of DR  Advised patient that they may experience improved satiety after meals and portion sizes of meals may be reduced as doses of Mounjaro increase.         Relevant Medications    tirzepatide (Mounjaro) 2.5 mg/0.5 mL pen injector (Start on 11/10/2024)    Other Relevant Orders    Referral to Clinical Pharmacy       Clinical Pharmacist follow-up: 11/21/2024 at 4:45 PM, Telehealth visit    Continue all meds under the continuation of care with the referring provider and clinical pharmacy team.    Thank you,  Sultana Galeana, PharmD  Clinical Pharmacist  [phone]    Verbal consent to manage patient's drug therapy was obtained from the patient. They were informed they may decline to participate or withdraw from participation in pharmacy services at any time.

## 2024-11-07 NOTE — ASSESSMENT & PLAN NOTE
Current regimen includes Mounjaro 2.5 mg weekly. Has lost about 35 lbs since starting therapy plan. Due to continued progress on Mounjaro 2.5 mg, plan to continue current therapy. When patient has tried to increase Mounjaro in the past, she has not tolerated and had nausea/vomiting. Patient would like to try to increase Mounjaro to help with additional weight loss, but will continue on current therapy. Patient does have 5mg dose at home and may try to increase if she is feeling okay with the 2.5mg dose.    Medication Changes:  CONTINUE  Mounjaro 2.5 mg once weekly. Will send to [facility] for mail order.    Monitoring and Education:  All questions and concerns addressed. Contact pharmacist with any further questions or concerns prior to next appointment.     Mounjaro Education:  Counseled patient on Mounjaro MOA, expectations, side effects, duration of therapy, administration, and monitoring parameters.  Counseled patient on the benefits of GLP-1ra, such as cardiovascular risk reduction, glycemic control, and weight loss potential.  Provided detailed dosing and administration counseling to ensure proper technique.   Reviewed Mounjaro titration schedule, starting with 2.5 mg once weekly to 5 mg, 7.5mg, 10mg, 12.5mg and if tolerated 15 mg.  Reviewed storage requirements of Mounjaro when not in use, and when to administer the medication if a dose is missed.  Discussed risks of GLP1ra including risk of pancreatitis, MTC and worsening of DR  Advised patient that they may experience improved satiety after meals and portion sizes of meals may be reduced as doses of Mounjaro increase.

## 2024-11-09 ENCOUNTER — PHARMACY VISIT (OUTPATIENT)
Dept: PHARMACY | Facility: CLINIC | Age: 44
End: 2024-11-09
Payer: COMMERCIAL

## 2024-11-12 RX ORDER — LOSARTAN POTASSIUM 25 MG/1
25 TABLET ORAL DAILY
Qty: 60 TABLET | Refills: 0 | Status: SHIPPED | OUTPATIENT
Start: 2024-11-12 | End: 2025-01-11

## 2024-11-21 ENCOUNTER — APPOINTMENT (OUTPATIENT)
Dept: PHARMACY | Facility: HOSPITAL | Age: 44
End: 2024-11-21
Payer: COMMERCIAL

## 2024-11-21 DIAGNOSIS — E66.09 CLASS 1 OBESITY DUE TO EXCESS CALORIES WITHOUT SERIOUS COMORBIDITY WITH BODY MASS INDEX (BMI) OF 31.0 TO 31.9 IN ADULT: ICD-10-CM

## 2024-11-21 DIAGNOSIS — I10 PRIMARY HYPERTENSION: ICD-10-CM

## 2024-11-21 DIAGNOSIS — E66.811 CLASS 1 OBESITY DUE TO EXCESS CALORIES WITHOUT SERIOUS COMORBIDITY WITH BODY MASS INDEX (BMI) OF 31.0 TO 31.9 IN ADULT: ICD-10-CM

## 2024-11-21 RX ORDER — TIRZEPATIDE 2.5 MG/.5ML
2.5 INJECTION, SOLUTION SUBCUTANEOUS
Qty: 2 ML | Refills: 2 | Status: SHIPPED | OUTPATIENT
Start: 2024-11-24

## 2024-11-21 NOTE — ASSESSMENT & PLAN NOTE
Patient was feeling symptoms of low blood pressure (dizzy, lightheaded, etc). She had not checked her blood pressure at home, but self discontinued her Lisinopril. Since, patient has been switched to Losartan 25mg daily and has not had any symptoms of low blood pressure.     START taking blood pressures at home. Patient picked up a monitor but has not started taking her blood pressure at home. Encouraged patient to take blood pressure a few times a week to make sure she is <130/80mmHg.

## 2024-11-21 NOTE — PROGRESS NOTES
Clinical Pharmacy Appointment    Patient ID: Sandy Nguyen is a 44 y.o. female who presents for Weight Management and Hypertension.    Pt is here for Follow Up appointment.     Referring Provider: Ruben Ruiz MD  PCP: Ruben Ruiz MD   Last visit with PCP: 9/25/2024   Next visit with PCP: 12/18/2024      Subjective     Interval History  Patient is experiencing dizziness and lightheadedness. States she believes it is related to blood pressure, but did not take her blood pressure at home. Stopped Lisinopril for one day and felt better.    HPI  WEIGHT LOSS  PMH significant for HTN/HLD, obesity, PCOS, osteoarthritis.  Special needs/barriers to therapy:  history of intolerance to higher doses of Mounjaro; cost    BMI Readings from Last 1 Encounters:   09/25/24 26.98 kg/m²     Current weight: 153lbs (9/19/2024)    Lab Results   Component Value Date    HGBA1C 5.1 03/27/2024    GLUCOSE 142 (H) 04/21/2024   Hyperglycemia: Denies signs and symptoms    Lifestyle  Denies recent changes to diet or exercise.    Other Potential Contributing Factors  Medications that may cause weight gain:  citalopram  Mental health: No current concerns  Eating Disorders: Denies Hx of any eating disorder  Comorbidities: dyslipidemias, hypertension, and osteoarthritis    Non-Pharmacological Therapy  Weight loss techniques attempted:  Self-directed dieting: and self-directed exercise.    Pharmacological Therapy  Current Medications: Mounjaro 2.5 mg subQ once weekly (Tuesdays)  Previous Medications: Mounjaro 5 mg weekly- N/V     Adherence: Takes medication as directed and reports no missed doses  Adverse Effects: currently tolerating well    Insurance coverage of weight-loss medications? Yes, only Mounjaro     Preventative Care  ASCVD Risk:   The 10-year ASCVD risk score (Reba DÍAZ, et al., 2019) is: 2.2%    Values used to calculate the score:      Age: 44 years      Sex: Female      Is Non- : No       Diabetic: No      Tobacco smoker: Yes      Systolic Blood Pressure: 114 mmHg      Is BP treated: Yes      HDL Cholesterol: 48 mg/dL      Total Cholesterol: 141 mg/dL  On Statin?: Yes, atorvastatin 20 mg    Immunizations Needed: Tdap    Medication Reconciliation:  No changes made    Drug Interactions  No relevant drug interactions were noted.    Medication System Management  Patient's preferred pharmacy: [facility]  Adherence/Organization: appropriate  Affordability/Accessibility: no issues with Mounjaro      Objective   Allergies   Allergen Reactions    Penicillins Hives     Social History     Social History Narrative    Not on file      Medication Review  Current Outpatient Medications   Medication Instructions    atorvastatin (LIPITOR) 20 mg, oral, Daily    citalopram (CELEXA) 20 mg, oral, Daily    clindamycin (Cleocin T) 1 % lotion Topical, 2 times daily    losartan (COZAAR) 25 mg, oral, Daily    [START ON 11/24/2024] Mounjaro 2.5 mg, subcutaneous, Once Weekly    multivitamin with minerals (multivitamin-iron-folic acid) tablet 1 tablet, oral, Daily    promethazine (PHENERGAN) 25 mg, rectal, Every 6 hours PRN    triamcinolone (Kenalog) 0.1 % cream apply topically to hands 1 to 2 times daily as directed until resolved    vit C-Zn gluc-herbal no.325 (Elderberry Zinc Vit C) 90-15 mg lozenge oral      Vitals  BP Readings from Last 2 Encounters:   09/25/24 114/70   07/10/24 128/74     BMI Readings from Last 1 Encounters:   09/25/24 26.98 kg/m²      Labs  A1C  Lab Results   Component Value Date    HGBA1C 5.1 03/27/2024     BMP  Lab Results   Component Value Date    CALCIUM 9.1 04/21/2024     04/21/2024    K 3.7 04/21/2024    CO2 21 04/21/2024     04/21/2024    BUN 15 04/21/2024    CREATININE 0.66 04/21/2024    EGFR >90 04/21/2024     LFTs  Lab Results   Component Value Date    ALT 19 04/21/2024    AST 21 04/21/2024    ALKPHOS 87 04/21/2024    BILITOT 0.8 04/21/2024     FLP  Lab Results   Component Value Date  "   TRIG 65 03/27/2024    CHOL 141 03/27/2024    LDLF 114 (H) 05/03/2023    LDLCALC 80 03/27/2024    HDL 48.0 03/27/2024     Urine Microalbumin  No results found for: \"MICROALBCREA\"  Weight Management  Wt Readings from Last 3 Encounters:   09/25/24 71.3 kg (157 lb 3.2 oz)   07/10/24 75.3 kg (166 lb)   05/08/24 79.8 kg (176 lb)      There is no height or weight on file to calculate BMI.     Assessment/Plan   Problem List Items Addressed This Visit       Hypertension     Patient was feeling symptoms of low blood pressure (dizzy, lightheaded, etc). She had not checked her blood pressure at home, but self discontinued her Lisinopril. Since, patient has been switched to Losartan 25mg daily and has not had any symptoms of low blood pressure.     START taking blood pressures at home. Patient picked up a monitor but has not started taking her blood pressure at home. Encouraged patient to take blood pressure a few times a week to make sure she is <130/80mmHg.         Class 1 obesity due to excess calories without serious comorbidity with body mass index (BMI) of 31.0 to 31.9 in adult     Current regimen includes Mounjaro 2.5 mg weekly. Has lost about 47 lbs since starting therapy plan. Due to continued progress on Mounjaro 2.5 mg, plan to continue current therapy. When patient has tried to increase Mounjaro in the past, she has not tolerated and had nausea/vomiting. Patient would like to try to increase Mounjaro to help with additional weight loss, but will continue on current therapy. Patient does have 5mg dose at home and may try to increase if she is feeling okay with the 2.5mg dose.    Medication Changes:  CONTINUE  Mounjaro 2.5 mg once weekly. Will send to [facility] for mail order.    Monitoring and Education:  All questions and concerns addressed. Contact pharmacist with any further questions or concerns prior to next appointment.     Mounjaro Education:  Counseled patient on Mounjaro MOA, expectations, side " effects, duration of therapy, administration, and monitoring parameters.  Counseled patient on the benefits of GLP-1ra, such as cardiovascular risk reduction, glycemic control, and weight loss potential.  Provided detailed dosing and administration counseling to ensure proper technique.   Reviewed Mounjaro titration schedule, starting with 2.5 mg once weekly to 5 mg, 7.5mg, 10mg, 12.5mg and if tolerated 15 mg.  Reviewed storage requirements of Mounjaro when not in use, and when to administer the medication if a dose is missed.  Discussed risks of GLP1ra including risk of pancreatitis, MTC and worsening of DR  Advised patient that they may experience improved satiety after meals and portion sizes of meals may be reduced as doses of Mounjaro increase.         Relevant Medications    tirzepatide (Mounjaro) 2.5 mg/0.5 mL pen injector (Start on 11/24/2024)    Other Relevant Orders    Referral to Clinical Pharmacy       Clinical Pharmacist follow-up: 2/26/2025 at 11:00AM, Telehealth visit    Continue all meds under the continuation of care with the referring provider and clinical pharmacy team.    Thank you,  Sultana Galeana, PharmD  Clinical Pharmacist  [phone]    Verbal consent to manage patient's drug therapy was obtained from the patient. They were informed they may decline to participate or withdraw from participation in pharmacy services at any time.

## 2024-11-21 NOTE — ASSESSMENT & PLAN NOTE
Current regimen includes Mounjaro 2.5 mg weekly. Has lost about 47 lbs since starting therapy plan. Due to continued progress on Mounjaro 2.5 mg, plan to continue current therapy. When patient has tried to increase Mounjaro in the past, she has not tolerated and had nausea/vomiting. Patient would like to try to increase Mounjaro to help with additional weight loss, but will continue on current therapy. Patient does have 5mg dose at home and may try to increase if she is feeling okay with the 2.5mg dose.    Medication Changes:  CONTINUE  Mounjaro 2.5 mg once weekly. Will send to [facility] for mail order.    Monitoring and Education:  All questions and concerns addressed. Contact pharmacist with any further questions or concerns prior to next appointment.     Mounjaro Education:  Counseled patient on Mounjaro MOA, expectations, side effects, duration of therapy, administration, and monitoring parameters.  Counseled patient on the benefits of GLP-1ra, such as cardiovascular risk reduction, glycemic control, and weight loss potential.  Provided detailed dosing and administration counseling to ensure proper technique.   Reviewed Mounjaro titration schedule, starting with 2.5 mg once weekly to 5 mg, 7.5mg, 10mg, 12.5mg and if tolerated 15 mg.  Reviewed storage requirements of Mounjaro when not in use, and when to administer the medication if a dose is missed.  Discussed risks of GLP1ra including risk of pancreatitis, MTC and worsening of DR  Advised patient that they may experience improved satiety after meals and portion sizes of meals may be reduced as doses of Mounjaro increase.

## 2024-12-06 PROCEDURE — RXMED WILLOW AMBULATORY MEDICATION CHARGE

## 2024-12-11 ENCOUNTER — PHARMACY VISIT (OUTPATIENT)
Dept: PHARMACY | Facility: CLINIC | Age: 44
End: 2024-12-11
Payer: COMMERCIAL

## 2024-12-18 ENCOUNTER — APPOINTMENT (OUTPATIENT)
Dept: PRIMARY CARE | Facility: CLINIC | Age: 44
End: 2024-12-18
Payer: COMMERCIAL

## 2024-12-18 VITALS
SYSTOLIC BLOOD PRESSURE: 126 MMHG | BODY MASS INDEX: 25.03 KG/M2 | DIASTOLIC BLOOD PRESSURE: 72 MMHG | RESPIRATION RATE: 16 BRPM | TEMPERATURE: 97.3 F | HEART RATE: 96 BPM | OXYGEN SATURATION: 100 % | HEIGHT: 64 IN | WEIGHT: 146.6 LBS

## 2024-12-18 DIAGNOSIS — E66.09 CLASS 1 OBESITY DUE TO EXCESS CALORIES WITHOUT SERIOUS COMORBIDITY WITH BODY MASS INDEX (BMI) OF 31.0 TO 31.9 IN ADULT: ICD-10-CM

## 2024-12-18 DIAGNOSIS — M19.90 OSTEOARTHRITIS, UNSPECIFIED OSTEOARTHRITIS TYPE, UNSPECIFIED SITE: ICD-10-CM

## 2024-12-18 DIAGNOSIS — E53.8 VITAMIN B 12 DEFICIENCY: ICD-10-CM

## 2024-12-18 DIAGNOSIS — E78.00 PURE HYPERCHOLESTEROLEMIA: ICD-10-CM

## 2024-12-18 DIAGNOSIS — R53.83 FATIGUE, UNSPECIFIED TYPE: ICD-10-CM

## 2024-12-18 DIAGNOSIS — E66.811 CLASS 1 OBESITY DUE TO EXCESS CALORIES WITHOUT SERIOUS COMORBIDITY WITH BODY MASS INDEX (BMI) OF 31.0 TO 31.9 IN ADULT: ICD-10-CM

## 2024-12-18 DIAGNOSIS — E78.41 ELEVATED LIPOPROTEIN(A): ICD-10-CM

## 2024-12-18 DIAGNOSIS — R10.12 LEFT UPPER QUADRANT ABDOMINAL PAIN: ICD-10-CM

## 2024-12-18 DIAGNOSIS — I10 PRIMARY HYPERTENSION: ICD-10-CM

## 2024-12-18 DIAGNOSIS — Z23 NEED FOR INFLUENZA VACCINATION: ICD-10-CM

## 2024-12-18 DIAGNOSIS — F32.9 REACTIVE DEPRESSION: ICD-10-CM

## 2024-12-18 DIAGNOSIS — L30.9 ECZEMA, UNSPECIFIED TYPE: ICD-10-CM

## 2024-12-18 DIAGNOSIS — E28.2 PCOS (POLYCYSTIC OVARIAN SYNDROME): ICD-10-CM

## 2024-12-18 DIAGNOSIS — L73.2 HIDRADENITIS SUPPURATIVA: Primary | ICD-10-CM

## 2024-12-18 DIAGNOSIS — I49.3 FREQUENT PVCS: ICD-10-CM

## 2024-12-18 DIAGNOSIS — L03.115 CELLULITIS OF RIGHT LOWER EXTREMITY: ICD-10-CM

## 2024-12-18 DIAGNOSIS — E55.9 VITAMIN D DEFICIENCY: ICD-10-CM

## 2024-12-18 PROCEDURE — 99214 OFFICE O/P EST MOD 30 MIN: CPT | Performed by: FAMILY MEDICINE

## 2024-12-18 RX ORDER — ATORVASTATIN CALCIUM 20 MG/1
20 TABLET, FILM COATED ORAL DAILY
Qty: 90 TABLET | Refills: 1 | Status: SHIPPED | OUTPATIENT
Start: 2024-12-18

## 2024-12-18 RX ORDER — LOSARTAN POTASSIUM 25 MG/1
25 TABLET ORAL DAILY
Qty: 90 TABLET | Refills: 1 | Status: SHIPPED | OUTPATIENT
Start: 2024-12-18 | End: 2025-06-16

## 2024-12-18 RX ORDER — CITALOPRAM 20 MG/1
20 TABLET, FILM COATED ORAL DAILY
Qty: 90 TABLET | Refills: 1 | Status: SHIPPED | OUTPATIENT
Start: 2024-12-18

## 2024-12-18 RX ORDER — CEFDINIR 300 MG/1
300 CAPSULE ORAL 2 TIMES DAILY
Qty: 40 CAPSULE | Refills: 0 | Status: SHIPPED | OUTPATIENT
Start: 2024-12-18 | End: 2025-01-07

## 2024-12-18 ASSESSMENT — PATIENT HEALTH QUESTIONNAIRE - PHQ9
1. LITTLE INTEREST OR PLEASURE IN DOING THINGS: NOT AT ALL
2. FEELING DOWN, DEPRESSED OR HOPELESS: NOT AT ALL
SUM OF ALL RESPONSES TO PHQ9 QUESTIONS 1 AND 2: 0

## 2024-12-18 ASSESSMENT — ENCOUNTER SYMPTOMS
LOSS OF SENSATION IN FEET: 0
OCCASIONAL FEELINGS OF UNSTEADINESS: 0
DEPRESSION: 0

## 2024-12-18 NOTE — PROGRESS NOTES
Subjective   Patient ID: Sandy Nguyen is a 44 y.o. female who presents for Medication Problem and Weight Loss.    HPI   Patient is at the office today to request a different antibiotic treatment for her HS flare up. According with pharmacist antibiotic prescribed (levofloxacin)on the past will cause heart palpitations if it is used in combination with Citalopram. Patient reports she got the prescription but never took the medication.    Weight loss treatment needs to be discussed today. Patient reports she is using Mounjaro 2.5 mg weekly. No negative side effects reported. Patient current weight is 146.6 lbs.  Review of Systems  12 Systems have been reviewed as follows.  Constitutional: Fever, weight gain, weight loss, appetite change, night sweats, fatigue, chills.  Eyes : blurry, double vision, vision, loss, tearing, redness, pain, sensitivity to light, glaucoma.  Ears, nose, mouth, and throat: Hearing loss, ringing in the ears, ear pain, nasal congestion, nasal drainage, nosebleeds, mouth, throat, irritation tooth problem.  Cardiovascular :chest pain, pressure, heart racing, palpitations, sweating, leg swelling, high or low blood pressure  Pulmonary: Cough, yellow or green sputum, blood and sputum, shortness of breath, wheezing  Gastrointestinal: Nausea, vomiting, diarrhea, constipation, pain, blood in stool, or vomitus, heartburn, difficulty swallowing  Genitourinary: incontinence, abnormal bleeding, abnormal discharge, urinary frequency, urinary hesitancy, pain, impotence sexual problem, infection, urinary retention  Musculoskeletal: Pain, stiffness, joint, redness or warmth, arthritis, back pain, weakness, muscle wasting, sprain or fracture  Neuro: Weight weakness, dizziness, change in voice, change in taste change in vision, change in hearing, loss, or change of sensation, trouble walking, balance problems coordination problems, shaking, speech problem  Endocrine , cold or heat intolerance, blood sugar  "problem, weight gain or loss missed periods hot flashes, sweats, change in body hair, change in libido, increased thirst, increased urination  Heme/lymph: Swelling, bleeding, problem anemia, bruising, enlarged lymph nodes  Allergic/immunologic: H. plus nasal drip, watery itchy eyes, nasal drainage, immunosuppressed  The above were reviewed and noted negative except as noted in HPI and Problem List.    Objective   /72 (BP Location: Right arm, Patient Position: Sitting, BP Cuff Size: Adult)   Pulse 96   Temp 36.3 °C (97.3 °F) (Temporal)   Resp 16   Ht 1.626 m (5' 4\")   Wt 66.5 kg (146 lb 9.6 oz)   SpO2 100%   BMI 25.16 kg/m²     Physical Exam  Constitutional: Well developed, well nourished, alert and in no acute distress   Eyes: Normal external exam. Pupils equally round and reactive to light with normal accommodation and extraocular movements intact.  Neck: Supple, no lymphadenopathy or masses.   Cardiovascular: Regular rate and rhythm, normal S1 and S2, no murmurs, gallops, or rubs. Radial pulses normal. No peripheral edema.  Pulmonary: No respiratory distress, lungs clear to auscultation bilaterally. No wheezes, rhonchi, rales.  Abdomen: soft,non tender, non distended, without masses or HSM  Skin: Warm, well perfused, normal skin turgor and color.   Neurologic: Cranial nerves II-XII grossly intact.   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    Relevant Orders    Follow Up [facility] - PCP - Established    Reactive depression F32.9    Relevant Medications    citalopram (CeleXA) 20 mg tablet    Other Relevant Orders    Follow Up [facility] - " PCP - Established    Eczema L30.9    Relevant Orders    Follow Up [facility] - PCP - Established    PCOS (polycystic ovarian syndrome) E28.2    Relevant Orders    Follow Up [facility] - PCP - Established    Pure hypercholesterolemia E78.00    Relevant Medications    atorvastatin (Lipitor) 20 mg tablet    Other Relevant Orders    Follow Up [facility] - PCP - Established    Vitamin B 12 deficiency E53.8    Relevant Orders    Follow Up [facility] - PCP - Established    Class 1 obesity due to excess calories without serious comorbidity with body mass index (BMI) of 31.0 to 31.9 in adult E66.811, E66.09, Z68.31    Relevant Orders    Follow Up [facility] - PCP - Established    Osteoarthrosis M19.90    Relevant Orders    Follow Up [facility] - PCP - Established    Hidradenitis suppurativa - Primary L73.2    Relevant Orders    Follow Up [facility] - PCP - Established    Fatigue R53.83    Relevant Orders    Follow Up [facility] - PCP - Established    Vitamin D deficiency E55.9    Relevant Orders    Follow Up [facility] - PCP - Established     Other Visit Diagnoses         Codes    Left upper quadrant abdominal pain     R10.12    Relevant Orders    Follow Up [facility] - PCP - Established    Frequent PVCs     I49.3    Relevant Orders    Follow Up [facility] - PCP - Established    Need for influenza vaccination     Z23    Relevant Orders    Follow Up [facility] - PCP - Established    Cellulitis of right lower extremity     L03.115    Relevant Medications    cefdinir (Omnicef) 300 mg capsule    Other Relevant Orders    Follow Up [facility] - PCP - Established          Continue current medications and therapy for chronic medical conditions      Cont. Mounjaro 2.5 mg weekly     -Discuss humira for hidradenitis in the future      -All dx improved       Patient was advised importance of proper diet/nutrition in addition adequate hydration. Patient was encouraged moderate exercise program to include 30 minutes daily for 5 days of the week or 150 minutes weekly. Patient will follow-up with us as scheduled.

## 2025-01-01 PROCEDURE — RXMED WILLOW AMBULATORY MEDICATION CHARGE

## 2025-01-06 ENCOUNTER — PHARMACY VISIT (OUTPATIENT)
Dept: PHARMACY | Facility: CLINIC | Age: 45
End: 2025-01-06
Payer: COMMERCIAL

## 2025-01-29 PROCEDURE — RXMED WILLOW AMBULATORY MEDICATION CHARGE

## 2025-01-31 ENCOUNTER — PHARMACY VISIT (OUTPATIENT)
Dept: PHARMACY | Facility: CLINIC | Age: 45
End: 2025-01-31
Payer: COMMERCIAL

## 2025-02-19 DIAGNOSIS — E66.811 CLASS 1 OBESITY DUE TO EXCESS CALORIES WITHOUT SERIOUS COMORBIDITY WITH BODY MASS INDEX (BMI) OF 31.0 TO 31.9 IN ADULT: ICD-10-CM

## 2025-02-19 DIAGNOSIS — E66.09 CLASS 1 OBESITY DUE TO EXCESS CALORIES WITHOUT SERIOUS COMORBIDITY WITH BODY MASS INDEX (BMI) OF 31.0 TO 31.9 IN ADULT: ICD-10-CM

## 2025-02-19 NOTE — TELEPHONE ENCOUNTER
Patient of Dr. Sara Candelaria submitted for mounjaro     It is denied patient does not have type II diabetes   Patient is aware  I will try for an appeal   since she has been on medication for a while but insurance is asking for documenations of having A1c over 6.5 or glucose over 126 and also type II diabets

## 2025-02-20 RX ORDER — TIRZEPATIDE 2.5 MG/.5ML
2.5 INJECTION, SOLUTION SUBCUTANEOUS
Qty: 2 ML | Refills: 2 | Status: SHIPPED | OUTPATIENT
Start: 2025-02-23 | End: 2025-02-26 | Stop reason: SDUPTHER

## 2025-02-20 NOTE — TELEPHONE ENCOUNTER
Sage      I did submit an appeal on patient medication for Mounjaro     I did get the appeal approved for the patient from 01/21/2025 through 08/20/2025  Patient is aware that the appeal did get approved for her mounjaro

## 2025-02-20 NOTE — TELEPHONE ENCOUNTER
It should be okay if it has been with in a week. Usually the pharmacy holds the prescription for at least a week

## 2025-02-26 ENCOUNTER — APPOINTMENT (OUTPATIENT)
Dept: PHARMACY | Facility: HOSPITAL | Age: 45
End: 2025-02-26
Payer: COMMERCIAL

## 2025-02-26 DIAGNOSIS — E66.09 CLASS 1 OBESITY DUE TO EXCESS CALORIES WITHOUT SERIOUS COMORBIDITY WITH BODY MASS INDEX (BMI) OF 31.0 TO 31.9 IN ADULT: ICD-10-CM

## 2025-02-26 DIAGNOSIS — E66.811 CLASS 1 OBESITY DUE TO EXCESS CALORIES WITHOUT SERIOUS COMORBIDITY WITH BODY MASS INDEX (BMI) OF 31.0 TO 31.9 IN ADULT: ICD-10-CM

## 2025-02-26 PROCEDURE — RXMED WILLOW AMBULATORY MEDICATION CHARGE

## 2025-02-26 RX ORDER — TIRZEPATIDE 2.5 MG/.5ML
2.5 INJECTION, SOLUTION SUBCUTANEOUS
Qty: 2 ML | Refills: 2 | Status: SHIPPED | OUTPATIENT
Start: 2025-03-02

## 2025-02-26 RX ORDER — TIRZEPATIDE 2.5 MG/.5ML
2.5 INJECTION, SOLUTION SUBCUTANEOUS
Qty: 2 ML | Refills: 2 | Status: SHIPPED | OUTPATIENT
Start: 2025-03-02 | End: 2025-02-26 | Stop reason: SDUPTHER

## 2025-02-26 NOTE — PROGRESS NOTES
Clinical Pharmacy Appointment    Patient ID: Snady Nguyen is a 44 y.o. female who presents for Weight Management.    Pt is here for Follow Up appointment.     Referring Provider: Ruben Ruiz MD  PCP: Ruben Ruiz MD   Last visit with PCP: 12/18/2024   Next visit with PCP: 3/19/2024      Subjective     HPI  WEIGHT LOSS  PMH significant for HTN/HLD, obesity, PCOS, osteoarthritis.  Special needs/barriers to therapy:  history of intolerance to higher doses of Mounjaro; cost  Starting weight: 190 lbs (3/13/2024)  Current weight: 140lbs (2/26/2025)    Lab Results   Component Value Date    HGBA1C 5.1 03/27/2024    GLUCOSE 142 (H) 04/21/2024   Hyperglycemia: Denies signs and symptoms    Lifestyle  Denies recent changes to diet or exercise.    Other Potential Contributing Factors  Medications that may cause weight gain:  citalopram  Mental health: No current concerns  Eating Disorders: Denies Hx of any eating disorder  Comorbidities: dyslipidemias, hypertension, and osteoarthritis    Non-Pharmacological Therapy  Weight loss techniques attempted:  Self-directed dieting: and self-directed exercise.    Pharmacological Therapy  Current Medications: Mounjaro 2.5 mg subQ once weekly (Tuesdays)  Previous Medications: Mounjaro 5 mg weekly- N/V     Adherence: Takes medication as directed and reports no missed doses  Adverse Effects: currently tolerating well    Insurance coverage of weight-loss medications? Yes, only Mounjaro     Preventative Care  ASCVD Risk:   The 10-year ASCVD risk score (Reba DÍAZ, et al., 2019) is: 2.6%    Values used to calculate the score:      Age: 44 years      Sex: Female      Is Non- : No      Diabetic: No      Tobacco smoker: Yes      Systolic Blood Pressure: 126 mmHg      Is BP treated: Yes      HDL Cholesterol: 48 mg/dL      Total Cholesterol: 141 mg/dL  On Statin?: Yes, atorvastatin 20 mg    Immunizations Needed: Tdap    Medication Reconciliation:  No changes  "made    Drug Interactions  No relevant drug interactions were noted.    Medication System Management  Patient's preferred pharmacy: [facility]  Adherence/Organization: appropriate  Affordability/Accessibility: deductible needs to be met in the new year, patient aware      Objective   Allergies   Allergen Reactions    Penicillins Hives     Social History     Social History Narrative    Not on file      Medication Review  Current Outpatient Medications   Medication Instructions    atorvastatin (LIPITOR) 20 mg, oral, Daily    citalopram (CELEXA) 20 mg, oral, Daily    clindamycin (Cleocin T) 1 % lotion 2 times daily    losartan (COZAAR) 25 mg, oral, Daily    [START ON 3/2/2025] Mounjaro 2.5 mg, subcutaneous, Once Weekly    multivitamin with minerals (multivitamin-iron-folic acid) tablet 1 tablet, Daily    promethazine (PHENERGAN) 25 mg, rectal, Every 6 hours PRN    triamcinolone (Kenalog) 0.1 % cream apply topically to hands 1 to 2 times daily as directed until resolved    vit C-Zn gluc-herbal no.325 (Elderberry Zinc Vit C) 90-15 mg lozenge Take by mouth.      Vitals  BP Readings from Last 2 Encounters:   12/18/24 126/72   09/25/24 114/70     BMI Readings from Last 1 Encounters:   12/18/24 25.16 kg/m²      Labs  A1C  Lab Results   Component Value Date    HGBA1C 5.1 03/27/2024     BMP  Lab Results   Component Value Date    CALCIUM 9.1 04/21/2024     04/21/2024    K 3.7 04/21/2024    CO2 21 04/21/2024     04/21/2024    BUN 15 04/21/2024    CREATININE 0.66 04/21/2024    EGFR >90 04/21/2024     LFTs  Lab Results   Component Value Date    ALT 19 04/21/2024    AST 21 04/21/2024    ALKPHOS 87 04/21/2024    BILITOT 0.8 04/21/2024     FLP  Lab Results   Component Value Date    TRIG 65 03/27/2024    CHOL 141 03/27/2024    LDLF 114 (H) 05/03/2023    LDLCALC 80 03/27/2024    HDL 48.0 03/27/2024     Urine Microalbumin  No results found for: \"MICROALBCREA\"  Weight Management  Wt Readings from Last 3 Encounters:   12/18/24 " 66.5 kg (146 lb 9.6 oz)   09/25/24 71.3 kg (157 lb 3.2 oz)   07/10/24 75.3 kg (166 lb)      There is no height or weight on file to calculate BMI.     Assessment/Plan   Problem List Items Addressed This Visit       Class 1 obesity due to excess calories without serious comorbidity with body mass index (BMI) of 31.0 to 31.9 in adult     Current regimen includes Mounjaro 2.5 mg weekly. Has lost about 50 lbs since starting therapy plan. Due to continued progress on Mounjaro 2.5 mg, plan to continue current therapy. When patient has tried to increase Mounjaro in the past, she has not tolerated and had nausea/vomiting. Patient would like to try to increase Mounjaro to help with additional weight loss, but will continue on current therapy.     Patient has a deductible to meet at the beginning of the year, so she will have 2 months of high cost payments of Mounjaro, but then should decrease back down between $0-25. Patient is okay with this increase for a short period of time.    Medication Changes:  CONTINUE  Mounjaro 2.5 mg once weekly. Will send to [facility] for mail order.    Monitoring and Education:  All questions and concerns addressed. Contact pharmacist with any further questions or concerns prior to next appointment.     Mounjaro Education:  Counseled patient on Mounjaro MOA, expectations, side effects, duration of therapy, administration, and monitoring parameters.  Counseled patient on the benefits of GLP-1ra, such as cardiovascular risk reduction, glycemic control, and weight loss potential.  Provided detailed dosing and administration counseling to ensure proper technique.   Reviewed Mounjaro titration schedule, starting with 2.5 mg once weekly to 5 mg, 7.5mg, 10mg, 12.5mg and if tolerated 15 mg.  Reviewed storage requirements of Mounjaro when not in use, and when to administer the medication if a dose is missed.  Discussed risks of GLP1ra including risk of pancreatitis, MTC and worsening of  DR  Advised patient that they may experience improved satiety after meals and portion sizes of meals may be reduced as doses of Mounjaro increase.         Relevant Medications    tirzepatide (Mounjaro) 2.5 mg/0.5 mL pen injector (Start on 3/2/2025)    Other Relevant Orders    Referral to Clinical Pharmacy     Clinical Pharmacist follow-up: 5/28/2025 at 11:00AM, Telehealth visit    Continue all meds under the continuation of care with the referring provider and clinical pharmacy team.    Thank you,  Sultana Galeana, PharmD  Clinical Pharmacist  [phone]    Verbal consent to manage patient's drug therapy was obtained from the patient. They were informed they may decline to participate or withdraw from participation in pharmacy services at any time.

## 2025-02-26 NOTE — TELEPHONE ENCOUNTER
Patient of Dr. Sara Chávez request    tirzepatide (Mounjaro) 2.5 mg/0.5 mL pen injector 2 mL 2 2/23/2025 --    Sig - Route: Inject 2.5 mg under the skin 1 (one) time per week. - subcutaneous      [facility] Phone: [phone]   Fax: [phone]          Prior auth appeal is approved for patients mounjaro she tried to go to [facility] but they where charging her 1000 dollars so she wants to go to  pharmacy.   Appeal approval is scanned into patients chart.

## 2025-02-27 ENCOUNTER — PHARMACY VISIT (OUTPATIENT)
Dept: PHARMACY | Facility: CLINIC | Age: 45
End: 2025-02-27
Payer: COMMERCIAL

## 2025-02-27 NOTE — ASSESSMENT & PLAN NOTE
Current regimen includes Mounjaro 2.5 mg weekly. Has lost about 50 lbs since starting therapy plan. Due to continued progress on Mounjaro 2.5 mg, plan to continue current therapy. When patient has tried to increase Mounjaro in the past, she has not tolerated and had nausea/vomiting. Patient would like to try to increase Mounjaro to help with additional weight loss, but will continue on current therapy.     Patient has a deductible to meet at the beginning of the year, so she will have 2 months of high cost payments of Mounjaro, but then should decrease back down between $0-25. Patient is okay with this increase for a short period of time.    Medication Changes:  CONTINUE  Mounjaro 2.5 mg once weekly. Will send to [facility] for mail order.    Monitoring and Education:  All questions and concerns addressed. Contact pharmacist with any further questions or concerns prior to next appointment.     Mounjaro Education:  Counseled patient on Mounjaro MOA, expectations, side effects, duration of therapy, administration, and monitoring parameters.  Counseled patient on the benefits of GLP-1ra, such as cardiovascular risk reduction, glycemic control, and weight loss potential.  Provided detailed dosing and administration counseling to ensure proper technique.   Reviewed Mounjaro titration schedule, starting with 2.5 mg once weekly to 5 mg, 7.5mg, 10mg, 12.5mg and if tolerated 15 mg.  Reviewed storage requirements of Mounjaro when not in use, and when to administer the medication if a dose is missed.  Discussed risks of GLP1ra including risk of pancreatitis, MTC and worsening of DR  Advised patient that they may experience improved satiety after meals and portion sizes of meals may be reduced as doses of Mounjaro increase.

## 2025-03-19 ENCOUNTER — APPOINTMENT (OUTPATIENT)
Dept: PRIMARY CARE | Facility: CLINIC | Age: 45
End: 2025-03-19
Payer: COMMERCIAL

## 2025-03-19 VITALS
WEIGHT: 138.2 LBS | TEMPERATURE: 97.8 F | BODY MASS INDEX: 23.72 KG/M2 | SYSTOLIC BLOOD PRESSURE: 90 MMHG | HEART RATE: 76 BPM | OXYGEN SATURATION: 99 % | DIASTOLIC BLOOD PRESSURE: 62 MMHG

## 2025-03-19 DIAGNOSIS — E78.2 MIXED HYPERLIPIDEMIA: ICD-10-CM

## 2025-03-19 DIAGNOSIS — E66.811 CLASS 1 OBESITY DUE TO EXCESS CALORIES WITHOUT SERIOUS COMORBIDITY WITH BODY MASS INDEX (BMI) OF 31.0 TO 31.9 IN ADULT: ICD-10-CM

## 2025-03-19 DIAGNOSIS — R10.12 LEFT UPPER QUADRANT ABDOMINAL PAIN: ICD-10-CM

## 2025-03-19 DIAGNOSIS — E78.41 ELEVATED LIPOPROTEIN(A): ICD-10-CM

## 2025-03-19 DIAGNOSIS — L73.2 HIDRADENITIS SUPPURATIVA: ICD-10-CM

## 2025-03-19 DIAGNOSIS — E53.8 VITAMIN B 12 DEFICIENCY: ICD-10-CM

## 2025-03-19 DIAGNOSIS — E55.9 VITAMIN D DEFICIENCY: ICD-10-CM

## 2025-03-19 DIAGNOSIS — Z23 NEED FOR INFLUENZA VACCINATION: ICD-10-CM

## 2025-03-19 DIAGNOSIS — L03.115 CELLULITIS OF RIGHT LOWER EXTREMITY: ICD-10-CM

## 2025-03-19 DIAGNOSIS — B37.31 YEAST VAGINITIS: Primary | ICD-10-CM

## 2025-03-19 DIAGNOSIS — E28.2 PCOS (POLYCYSTIC OVARIAN SYNDROME): ICD-10-CM

## 2025-03-19 DIAGNOSIS — M19.90 OSTEOARTHRITIS, UNSPECIFIED OSTEOARTHRITIS TYPE, UNSPECIFIED SITE: ICD-10-CM

## 2025-03-19 DIAGNOSIS — F32.9 REACTIVE DEPRESSION: ICD-10-CM

## 2025-03-19 DIAGNOSIS — E78.00 PURE HYPERCHOLESTEROLEMIA: ICD-10-CM

## 2025-03-19 DIAGNOSIS — I10 PRIMARY HYPERTENSION: ICD-10-CM

## 2025-03-19 DIAGNOSIS — R53.83 FATIGUE, UNSPECIFIED TYPE: ICD-10-CM

## 2025-03-19 DIAGNOSIS — I49.3 FREQUENT PVCS: ICD-10-CM

## 2025-03-19 DIAGNOSIS — E66.09 CLASS 1 OBESITY DUE TO EXCESS CALORIES WITHOUT SERIOUS COMORBIDITY WITH BODY MASS INDEX (BMI) OF 31.0 TO 31.9 IN ADULT: ICD-10-CM

## 2025-03-19 DIAGNOSIS — L30.9 ECZEMA, UNSPECIFIED TYPE: ICD-10-CM

## 2025-03-19 PROCEDURE — 3078F DIAST BP <80 MM HG: CPT | Performed by: FAMILY MEDICINE

## 2025-03-19 PROCEDURE — 3074F SYST BP LT 130 MM HG: CPT | Performed by: FAMILY MEDICINE

## 2025-03-19 PROCEDURE — 99214 OFFICE O/P EST MOD 30 MIN: CPT | Performed by: FAMILY MEDICINE

## 2025-03-19 RX ORDER — TRIAMCINOLONE ACETONIDE 1 MG/G
CREAM TOPICAL 2 TIMES DAILY
Qty: 80 G | Refills: 3 | Status: SHIPPED | OUTPATIENT
Start: 2025-03-19

## 2025-03-19 RX ORDER — FLUCONAZOLE 150 MG/1
150 TABLET ORAL ONCE
Qty: 1 TABLET | Refills: 6 | Status: SHIPPED | OUTPATIENT
Start: 2025-03-19 | End: 2025-03-19

## 2025-03-19 RX ORDER — DOXYCYCLINE 100 MG/1
100 CAPSULE ORAL 2 TIMES DAILY
Qty: 40 CAPSULE | Refills: 0 | Status: SHIPPED | OUTPATIENT
Start: 2025-03-19 | End: 2025-04-08

## 2025-03-19 ASSESSMENT — ENCOUNTER SYMPTOMS
APNEA: 0
STRIDOR: 0
HEMATURIA: 0
HEADACHES: 0
AGITATION: 0
SHORTNESS OF BREATH: 0
WHEEZING: 0
NUMBNESS: 0
GASTROINTESTINAL NEGATIVE: 1
FREQUENCY: 0
VOMITING: 0
RECTAL PAIN: 0
WOUND: 0
FATIGUE: 0
ACTIVITY CHANGE: 0
HALLUCINATIONS: 0
DIARRHEA: 0
JOINT SWELLING: 0
EYE DISCHARGE: 0
PHOTOPHOBIA: 0
DECREASED CONCENTRATION: 0
CARDIOVASCULAR NEGATIVE: 1
BACK PAIN: 0
FLANK PAIN: 0
SINUS PRESSURE: 0
SPEECH DIFFICULTY: 0
NERVOUS/ANXIOUS: 0
RHINORRHEA: 0
ABDOMINAL DISTENTION: 0
LIGHT-HEADEDNESS: 0
CONSTIPATION: 0
POLYPHAGIA: 0
ADENOPATHY: 0
DIZZINESS: 0
TREMORS: 0
SINUS PAIN: 0
POLYDIPSIA: 0
BLOOD IN STOOL: 0
UNEXPECTED WEIGHT CHANGE: 0
COUGH: 0
CHOKING: 0
CONFUSION: 0
DYSURIA: 0
WEAKNESS: 0
DYSPHORIC MOOD: 0
ABDOMINAL PAIN: 0
OCCASIONAL FEELINGS OF UNSTEADINESS: 0
FACIAL ASYMMETRY: 0
SEIZURES: 0
HYPERACTIVE: 0
CONSTITUTIONAL NEGATIVE: 1
NECK PAIN: 0
FEVER: 0
APPETITE CHANGE: 0
VOICE CHANGE: 0
SLEEP DISTURBANCE: 0
CHEST TIGHTNESS: 0
SORE THROAT: 0
FACIAL SWELLING: 0
TROUBLE SWALLOWING: 0
EYE ITCHING: 0
BRUISES/BLEEDS EASILY: 0
PALPITATIONS: 0
DIAPHORESIS: 0
NECK STIFFNESS: 0
NAUSEA: 0
EYE PAIN: 0
DEPRESSION: 0
EYE REDNESS: 0
ARTHRALGIAS: 0
MYALGIAS: 0
DIFFICULTY URINATING: 0
LOSS OF SENSATION IN FEET: 0
COLOR CHANGE: 0
ANAL BLEEDING: 0
CHILLS: 0

## 2025-03-19 ASSESSMENT — PATIENT HEALTH QUESTIONNAIRE - PHQ9
SUM OF ALL RESPONSES TO PHQ9 QUESTIONS 1 AND 2: 0
1. LITTLE INTEREST OR PLEASURE IN DOING THINGS: NOT AT ALL
2. FEELING DOWN, DEPRESSED OR HOPELESS: NOT AT ALL

## 2025-03-19 NOTE — PROGRESS NOTES
Subjective   Patient ID: Sandy Nguyen is a 44 y.o. female who presents for Weight Management and hudradenitis.    HPI   Patient is following up for weight management and hudradenitis suppurativa.  She has not 8 lbs in three months. She states she is doing well on mounjaro. She is also here for hydradenitis. She states the issue have not resolved. She did complete her antibiotics.  Review of Systems   Constitutional: Negative.  Negative for activity change, appetite change, chills, diaphoresis, fatigue, fever and unexpected weight change.   HENT: Negative.  Negative for congestion, dental problem, ear discharge, facial swelling, hearing loss, mouth sores, nosebleeds, postnasal drip, rhinorrhea, sinus pressure, sinus pain, sneezing, sore throat, tinnitus, trouble swallowing and voice change.    Eyes:  Negative for photophobia, pain, discharge, redness, itching and visual disturbance.   Respiratory:  Negative for apnea, cough, choking, chest tightness, shortness of breath, wheezing and stridor.    Cardiovascular: Negative.  Negative for chest pain, palpitations and leg swelling.   Gastrointestinal: Negative.  Negative for abdominal distention, abdominal pain, anal bleeding, blood in stool, constipation, diarrhea, nausea, rectal pain and vomiting.   Endocrine: Negative for cold intolerance, heat intolerance, polydipsia, polyphagia and polyuria.   Genitourinary:  Negative for decreased urine volume, difficulty urinating, dysuria, enuresis, flank pain, frequency, hematuria and urgency.   Musculoskeletal:  Negative for arthralgias, back pain, gait problem, joint swelling, myalgias, neck pain and neck stiffness.   Skin:  Negative for color change, pallor, rash and wound.   Allergic/Immunologic: Negative for environmental allergies, food allergies and immunocompromised state.   Neurological:  Negative for dizziness, tremors, seizures, syncope, facial asymmetry, speech difficulty, weakness, light-headedness, numbness and  headaches.   Hematological:  Negative for adenopathy. Does not bruise/bleed easily.   Psychiatric/Behavioral:  Negative for agitation, behavioral problems, confusion, decreased concentration, dysphoric mood, hallucinations, self-injury, sleep disturbance and suicidal ideas. The patient is not nervous/anxious and is not hyperactive.    All other systems reviewed and are negative.      Objective   BP 90/62 (BP Location: Left arm, Patient Position: Sitting, BP Cuff Size: Adult)   Pulse 76   Temp 36.6 °C (97.8 °F) (Temporal)   Wt 62.7 kg (138 lb 3.2 oz)   SpO2 99%   BMI 23.72 kg/m²     Physical Exam  Vitals reviewed.   Constitutional:       General: She is not in acute distress.     Appearance: Normal appearance. She is normal weight. She is not ill-appearing or diaphoretic.   HENT:      Head: Normocephalic.      Right Ear: Tympanic membrane and external ear normal.      Left Ear: Tympanic membrane and external ear normal.      Nose: Nose normal. No congestion.      Mouth/Throat:      Pharynx: No posterior oropharyngeal erythema.   Eyes:      General:         Right eye: No discharge.         Left eye: No discharge.      Extraocular Movements: Extraocular movements intact.      Conjunctiva/sclera: Conjunctivae normal.      Pupils: Pupils are equal, round, and reactive to light.   Cardiovascular:      Rate and Rhythm: Normal rate and regular rhythm.      Pulses: Normal pulses.      Heart sounds: Normal heart sounds. No murmur heard.  Pulmonary:      Effort: Pulmonary effort is normal. No respiratory distress.      Breath sounds: Normal breath sounds. No wheezing or rales.   Chest:      Chest wall: No tenderness.   Abdominal:      General: Abdomen is flat. Bowel sounds are normal. There is no distension.      Palpations: There is no mass.      Tenderness: There is no abdominal tenderness. There is no guarding.   Musculoskeletal:         General: No tenderness. Normal range of motion.      Cervical back: Normal range  of motion and neck supple. No tenderness.      Right lower leg: No edema.      Left lower leg: No edema.   Skin:     General: Skin is dry.      Coloration: Skin is not jaundiced.      Findings: No bruising, erythema or rash.   Neurological:      General: No focal deficit present.      Mental Status: She is alert and oriented to person, place, and time. Mental status is at baseline.      Cranial Nerves: No cranial nerve deficit.      Sensory: No sensory deficit.      Coordination: Coordination normal.      Gait: Gait normal.   Psychiatric:         Mood and Affect: Mood normal.         Thought Content: Thought content normal.         Judgment: Judgment normal.         Assessment/Plan   Problem List Items Addressed This Visit             ICD-10-CM    Hypertension I10    Relevant Orders    Follow Up In [facility] - PCP - Established    Elevated lipoprotein(a) E78.41    Relevant Orders    Follow Up [facility] - PCP - Established    Reactive depression F32.9    Relevant Orders    Follow Up [facility] - PCP - Established    Eczema L30.9    Relevant Medications    triamcinolone (Kenalog) 0.1 % cream    Other Relevant Orders    Follow Up [facility] - PCP - Established    PCOS (polycystic ovarian syndrome) E28.2    Relevant Orders    Follow Up [facility] - PCP - Established    Pure hypercholesterolemia E78.00    Relevant Orders    Follow Up [facility] - PCP - Established    Vitamin B 12 deficiency E53.8    Relevant Orders    Follow Up [facility] - PCP - Established    Class 1 obesity due to excess calories without serious comorbidity with body mass index (BMI) of 31.0 to 31.9 in adult E66.811, E66.09, Z68.31    Relevant Orders    Follow Up [facility] - PCP - Established    Osteoarthrosis M19.90    Relevant Orders    Follow Up [facility] - PCP - Established    Hidradenitis suppurativa L73.2    Relevant  Medications    doxycycline (Vibramycin) 100 mg capsule    Other Relevant Orders    Follow Up [facility] - PCP - Established    CBC and Auto Differential    Fatigue R53.83    Relevant Orders    Follow Up [facility] - PCP - Established    CBC and Auto Differential    Thyroid Stimulating Hormone    Free T4 Index    Vitamin D deficiency E55.9    Relevant Orders    Follow Up [facility] - PCP - Established    Vitamin D 25-Hydroxy,Total (for eval of Vitamin D levels)     Other Visit Diagnoses         Codes    Yeast vaginitis    -  Primary B37.31    Relevant Medications    fluconazole (Diflucan) 150 mg tablet    Other Relevant Orders    Follow Up [facility] - PCP - Established    Left upper quadrant abdominal pain     R10.12    Relevant Orders    Follow Up [facility] - PCP - Established    Frequent PVCs     I49.3    Relevant Orders    Follow Up [facility] - PCP - Established    Need for influenza vaccination     Z23    Relevant Orders    Follow Up [facility] - PCP - Established    Cellulitis of right lower extremity     L03.115    Relevant Orders    Follow Up [facility] - PCP - Established    Mixed hyperlipidemia     E78.2    Relevant Orders    Comprehensive Metabolic Panel    Lipid Panel        Continue current medications and therapy for chronic medical conditions     Cont. Mounjaro 2.5 mg weekly     -Discuss humira for hidradenitis in the future      Doing well    Stop neosporin       -All dx improved      Patient was advised importance of proper diet/nutrition in addition adequate hydration. Patient was encouraged moderate exercise program to include 30 minutes daily for 5 days of the week or 150 minutes weekly. Patient will follow-up with us as scheduled.    Scribe Attestation  By signing my name below, IAdamaris MA, Scribe   attest that this documentation has been prepared under the direction and in  the presence of Ruben Ruiz MD.    Provider Attestation - Scribe documentation    All medical record entries made by the Scribe were at my direction and personally dictated by me. I have reviewed the chart and agree that the record accurately reflects my personal performance of the history, physical exam, discussion and plan.

## 2025-03-24 PROCEDURE — RXMED WILLOW AMBULATORY MEDICATION CHARGE

## 2025-03-26 ENCOUNTER — PHARMACY VISIT (OUTPATIENT)
Dept: PHARMACY | Facility: CLINIC | Age: 45
End: 2025-03-26
Payer: COMMERCIAL

## 2025-04-18 PROCEDURE — RXMED WILLOW AMBULATORY MEDICATION CHARGE

## 2025-04-22 ENCOUNTER — PHARMACY VISIT (OUTPATIENT)
Dept: PHARMACY | Facility: CLINIC | Age: 45
End: 2025-04-22
Payer: COMMERCIAL

## 2025-05-15 DIAGNOSIS — E66.811 CLASS 1 OBESITY DUE TO EXCESS CALORIES WITHOUT SERIOUS COMORBIDITY WITH BODY MASS INDEX (BMI) OF 31.0 TO 31.9 IN ADULT: ICD-10-CM

## 2025-05-15 DIAGNOSIS — E66.09 CLASS 1 OBESITY DUE TO EXCESS CALORIES WITHOUT SERIOUS COMORBIDITY WITH BODY MASS INDEX (BMI) OF 31.0 TO 31.9 IN ADULT: ICD-10-CM

## 2025-05-15 RX ORDER — TIRZEPATIDE 2.5 MG/.5ML
2.5 INJECTION, SOLUTION SUBCUTANEOUS
Qty: 2 ML | Refills: 2 | OUTPATIENT
Start: 2025-05-18

## 2025-05-28 ENCOUNTER — APPOINTMENT (OUTPATIENT)
Dept: PHARMACY | Facility: HOSPITAL | Age: 45
End: 2025-05-28
Payer: COMMERCIAL

## 2025-05-28 DIAGNOSIS — E66.811 CLASS 1 OBESITY DUE TO EXCESS CALORIES WITHOUT SERIOUS COMORBIDITY WITH BODY MASS INDEX (BMI) OF 31.0 TO 31.9 IN ADULT: ICD-10-CM

## 2025-05-28 DIAGNOSIS — E66.09 CLASS 1 OBESITY DUE TO EXCESS CALORIES WITHOUT SERIOUS COMORBIDITY WITH BODY MASS INDEX (BMI) OF 31.0 TO 31.9 IN ADULT: ICD-10-CM

## 2025-05-28 PROCEDURE — RXMED WILLOW AMBULATORY MEDICATION CHARGE

## 2025-05-28 RX ORDER — TIRZEPATIDE 2.5 MG/.5ML
2.5 INJECTION, SOLUTION SUBCUTANEOUS
Qty: 2 ML | Refills: 3 | Status: SHIPPED | OUTPATIENT
Start: 2025-06-01

## 2025-05-28 NOTE — PROGRESS NOTES
Clinical Pharmacy Appointment    Patient ID: Sandy Nguyen is a 44 y.o. female who presents for Weight Management.    Pt is here for Follow Up appointment.     Referring Provider: Ruben Ruiz MD  PCP: Ruben Ruiz MD   Last visit with PCP: 3/19/2025   Next visit with PCP: 6/25/2025    The patient has been APPROVED for prior authorization for Mounjaro through 8/20/2025. Patient has been contacted regarding the status of their prior authorization.       Subjective     HPI  WEIGHT LOSS  PMH significant for HTN/HLD, obesity, PCOS, osteoarthritis.  Special needs/barriers to therapy: history of intolerance to higher doses of Mounjaro; cost  Starting weight: 190 lbs (3/13/2024)  Current weight: 135 lbs (5/28/2025)  Goal weight: 120 lbs    Lab Results   Component Value Date    HGBA1C 5.1 03/27/2024    GLUCOSE 142 (H) 04/21/2024   Hyperglycemia: Denies signs and symptoms    Lifestyle  Denies recent changes to diet or exercise.    Other Potential Contributing Factors  Medications that may cause weight gain: citalopram  Mental health: No current concerns  Eating Disorders: Denies Hx of any eating disorder  Comorbidities: dyslipidemias, hypertension, and osteoarthritis    Non-Pharmacological Therapy  Weight loss techniques attempted:  Self-directed dieting: and self-directed exercise.    Pharmacological Therapy  Current Medications: Mounjaro 2.5 mg subQ once weekly (Tuesdays)  Previous Medications: Mounjaro 5 mg weekly- N/V     Adherence: Takes medication as directed and reports no missed doses  Adverse Effects: currently tolerating well    Insurance coverage of weight-loss medications? Yes, only Mounjaro     Preventative Care  ASCVD Risk:   The 10-year ASCVD risk score (Reba DÍAZ, et al., 2019) is: 1.4%    Values used to calculate the score:      Age: 44 years      Sex: Female      Is Non- : No      Diabetic: No      Tobacco smoker: Yes      Systolic Blood Pressure: 90 mmHg      Is BP  "treated: Yes      HDL Cholesterol: 48 mg/dL      Total Cholesterol: 141 mg/dL  On Statin?: Yes, atorvastatin 20 mg    Immunizations Needed: Tdap    Medication Reconciliation:  No changes made    Drug Interactions  No relevant drug interactions were noted.    Medication System Management  Patient's preferred pharmacy:  [facility]  Adherence/Organization: appropriate  Affordability/Accessibility: deductible needs to be met in the new year, patient aware      Objective   Allergies   Allergen Reactions    Penicillins Hives     Social History     Social History Narrative    Not on file      Medication Review  Current Outpatient Medications   Medication Instructions    atorvastatin (LIPITOR) 20 mg, oral, Daily    citalopram (CELEXA) 20 mg, oral, Daily    losartan (COZAAR) 25 mg, oral, Daily    [START ON 6/1/2025] Mounjaro 2.5 mg, subcutaneous, Once Weekly    multivitamin with minerals (multivitamin-iron-folic acid) tablet 1 tablet, Daily    triamcinolone (Kenalog) 0.1 % cream Topical, 2 times daily    vit C-Zn gluc-herbal no.325 (Elderberry Zinc Vit C) 90-15 mg lozenge Take by mouth.      Vitals  BP Readings from Last 2 Encounters:   03/19/25 90/62   12/18/24 126/72     BMI Readings from Last 1 Encounters:   03/19/25 23.72 kg/m²      Labs  A1C  Lab Results   Component Value Date    HGBA1C 5.1 03/27/2024     BMP  Lab Results   Component Value Date    CALCIUM 9.1 04/21/2024     04/21/2024    K 3.7 04/21/2024    CO2 21 04/21/2024     04/21/2024    BUN 15 04/21/2024    CREATININE 0.66 04/21/2024    EGFR >90 04/21/2024     LFTs  Lab Results   Component Value Date    ALT 19 04/21/2024    AST 21 04/21/2024    ALKPHOS 87 04/21/2024    BILITOT 0.8 04/21/2024     FLP  Lab Results   Component Value Date    TRIG 65 03/27/2024    CHOL 141 03/27/2024    LDLF 114 (H) 05/03/2023    LDLCALC 80 03/27/2024    HDL 48.0 03/27/2024     Urine Microalbumin  No results found for: \"MICROALBCREA\"  Weight Management  Wt Readings from Last " 3 Encounters:   03/19/25 62.7 kg (138 lb 3.2 oz)   12/18/24 66.5 kg (146 lb 9.6 oz)   09/25/24 71.3 kg (157 lb 3.2 oz)      There is no height or weight on file to calculate BMI.     Assessment/Plan   Problem List Items Addressed This Visit       Class 1 obesity due to excess calories without serious comorbidity with body mass index (BMI) of 31.0 to 31.9 in adult    Current regimen includes Mounjaro 2.5mg weekly. Patient's current weight reported as 135 lbs. Has lost 55 lbs (~29% of TBW) since starting therapy plan. Due to continued tolerance of medication, plan to continue current therapy.    Medication Changes:  CONTINUE  Mounjaro 2.5 mg once weekly. Will send to [facility] for mail order.    Monitoring and Education:  All questions and concerns addressed. Contact pharmacist with any further questions or concerns prior to next appointment.     Mounjaro Education:  Counseled patient on Mounjaro MOA, expectations, side effects, duration of therapy, administration, and monitoring parameters.  Counseled patient on the benefits of GLP-1ra, such as cardiovascular risk reduction, glycemic control, and weight loss potential.  Provided detailed dosing and administration counseling to ensure proper technique.   Reviewed Mounjaro titration schedule, starting with 2.5 mg once weekly to 5 mg, 7.5mg, 10mg, 12.5mg and if tolerated 15 mg.  Reviewed storage requirements of Mounjaro when not in use, and when to administer the medication if a dose is missed.  Discussed risks of GLP1ra including risk of pancreatitis, MTC and worsening of DR  Advised patient that they may experience improved satiety after meals and portion sizes of meals may be reduced as doses of Mounjaro increase.         Relevant Medications    tirzepatide (Mounjaro) 2.5 mg/0.5 mL pen injector (Start on 6/1/2025)    Other Relevant Orders    Referral to Clinical Pharmacy       Clinical Pharmacist follow-up: 8/13/2025 at 11:00AM, Telehealth visit    Continue  all meds under the continuation of care with the referring provider and clinical pharmacy team.    Thank you,  Sultana Galeana, PharmD  Clinical Pharmacist  [phone]    Verbal consent to manage patient's drug therapy was obtained from the patient. They were informed they may decline to participate or withdraw from participation in pharmacy services at any time.

## 2025-05-28 NOTE — ASSESSMENT & PLAN NOTE
Current regimen includes Mounjaro 2.5mg weekly. Patient's current weight reported as 135 lbs. Has lost 55 lbs (~29% of TBW) since starting therapy plan. Due to continued tolerance of medication, plan to continue current therapy.    Medication Changes:  CONTINUE  Mounjaro 2.5 mg once weekly. Will send to [facility] for mail order.    Monitoring and Education:  All questions and concerns addressed. Contact pharmacist with any further questions or concerns prior to next appointment.     Mounjaro Education:  Counseled patient on Mounjaro MOA, expectations, side effects, duration of therapy, administration, and monitoring parameters.  Counseled patient on the benefits of GLP-1ra, such as cardiovascular risk reduction, glycemic control, and weight loss potential.  Provided detailed dosing and administration counseling to ensure proper technique.   Reviewed Mounjaro titration schedule, starting with 2.5 mg once weekly to 5 mg, 7.5mg, 10mg, 12.5mg and if tolerated 15 mg.  Reviewed storage requirements of Mounjaro when not in use, and when to administer the medication if a dose is missed.  Discussed risks of GLP1ra including risk of pancreatitis, MTC and worsening of DR  Advised patient that they may experience improved satiety after meals and portion sizes of meals may be reduced as doses of Mounjaro increase.

## 2025-06-02 ENCOUNTER — PHARMACY VISIT (OUTPATIENT)
Dept: PHARMACY | Facility: CLINIC | Age: 45
End: 2025-06-02
Payer: COMMERCIAL

## 2025-06-25 ENCOUNTER — APPOINTMENT (OUTPATIENT)
Dept: PRIMARY CARE | Facility: CLINIC | Age: 45
End: 2025-06-25
Payer: COMMERCIAL

## 2025-06-25 VITALS
WEIGHT: 133.2 LBS | TEMPERATURE: 97.5 F | HEART RATE: 87 BPM | BODY MASS INDEX: 22.74 KG/M2 | RESPIRATION RATE: 16 BRPM | HEIGHT: 64 IN | DIASTOLIC BLOOD PRESSURE: 80 MMHG | SYSTOLIC BLOOD PRESSURE: 120 MMHG | OXYGEN SATURATION: 97 %

## 2025-06-25 DIAGNOSIS — F32.9 REACTIVE DEPRESSION: ICD-10-CM

## 2025-06-25 DIAGNOSIS — E66.09 CLASS 1 OBESITY DUE TO EXCESS CALORIES WITHOUT SERIOUS COMORBIDITY WITH BODY MASS INDEX (BMI) OF 31.0 TO 31.9 IN ADULT: ICD-10-CM

## 2025-06-25 DIAGNOSIS — E78.41 ELEVATED LIPOPROTEIN(A): ICD-10-CM

## 2025-06-25 DIAGNOSIS — L30.9 ECZEMA, UNSPECIFIED TYPE: ICD-10-CM

## 2025-06-25 DIAGNOSIS — E66.811 CLASS 1 OBESITY DUE TO EXCESS CALORIES WITHOUT SERIOUS COMORBIDITY WITH BODY MASS INDEX (BMI) OF 31.0 TO 31.9 IN ADULT: ICD-10-CM

## 2025-06-25 DIAGNOSIS — L03.115 CELLULITIS OF RIGHT LOWER EXTREMITY: ICD-10-CM

## 2025-06-25 DIAGNOSIS — E53.8 VITAMIN B 12 DEFICIENCY: ICD-10-CM

## 2025-06-25 DIAGNOSIS — I10 PRIMARY HYPERTENSION: ICD-10-CM

## 2025-06-25 DIAGNOSIS — M19.90 OSTEOARTHRITIS, UNSPECIFIED OSTEOARTHRITIS TYPE, UNSPECIFIED SITE: ICD-10-CM

## 2025-06-25 DIAGNOSIS — B37.31 YEAST VAGINITIS: ICD-10-CM

## 2025-06-25 DIAGNOSIS — R53.83 FATIGUE, UNSPECIFIED TYPE: ICD-10-CM

## 2025-06-25 DIAGNOSIS — R10.12 LEFT UPPER QUADRANT ABDOMINAL PAIN: ICD-10-CM

## 2025-06-25 DIAGNOSIS — Z23 NEED FOR INFLUENZA VACCINATION: ICD-10-CM

## 2025-06-25 DIAGNOSIS — L73.2 HIDRADENITIS SUPPURATIVA: ICD-10-CM

## 2025-06-25 DIAGNOSIS — E55.9 VITAMIN D DEFICIENCY: ICD-10-CM

## 2025-06-25 DIAGNOSIS — E78.00 PURE HYPERCHOLESTEROLEMIA: ICD-10-CM

## 2025-06-25 DIAGNOSIS — I49.3 FREQUENT PVCS: ICD-10-CM

## 2025-06-25 DIAGNOSIS — E28.2 PCOS (POLYCYSTIC OVARIAN SYNDROME): ICD-10-CM

## 2025-06-25 PROCEDURE — 99214 OFFICE O/P EST MOD 30 MIN: CPT | Performed by: FAMILY MEDICINE

## 2025-06-25 PROCEDURE — RXMED WILLOW AMBULATORY MEDICATION CHARGE

## 2025-06-25 RX ORDER — LOSARTAN POTASSIUM 25 MG/1
25 TABLET ORAL DAILY
Qty: 90 TABLET | Refills: 1 | Status: SHIPPED | OUTPATIENT
Start: 2025-06-25 | End: 2025-12-22

## 2025-06-25 RX ORDER — TIRZEPATIDE 2.5 MG/.5ML
2.5 INJECTION, SOLUTION SUBCUTANEOUS
Qty: 2 ML | Refills: 5 | Status: SHIPPED | OUTPATIENT
Start: 2025-06-29

## 2025-06-25 RX ORDER — TRIAMCINOLONE ACETONIDE 1 MG/G
CREAM TOPICAL 2 TIMES DAILY
Qty: 80 G | Refills: 3 | Status: SHIPPED | OUTPATIENT
Start: 2025-06-25

## 2025-06-25 RX ORDER — ATORVASTATIN CALCIUM 20 MG/1
20 TABLET, FILM COATED ORAL DAILY
Qty: 90 TABLET | Refills: 1 | Status: SHIPPED | OUTPATIENT
Start: 2025-06-25

## 2025-06-25 RX ORDER — VIT C/ZN GLUC/HERBAL NO.325 90 MG-15MG
LOZENGE MUCOUS MEMBRANE
Status: CANCELLED | OUTPATIENT
Start: 2025-06-25

## 2025-06-25 RX ORDER — CITALOPRAM 20 MG/1
20 TABLET ORAL DAILY
Qty: 90 TABLET | Refills: 1 | Status: SHIPPED | OUTPATIENT
Start: 2025-06-25

## 2025-06-25 RX ORDER — ATORVASTATIN CALCIUM 20 MG/1
20 TABLET, FILM COATED ORAL DAILY
Qty: 90 TABLET | Refills: 0 | Status: SHIPPED | OUTPATIENT
Start: 2025-06-25

## 2025-06-25 RX ORDER — TIRZEPATIDE 2.5 MG/.5ML
2.5 INJECTION, SOLUTION SUBCUTANEOUS
Qty: 2 ML | Refills: 5 | Status: SHIPPED | OUTPATIENT
Start: 2025-06-29 | End: 2025-06-25 | Stop reason: SDUPTHER

## 2025-06-25 NOTE — TELEPHONE ENCOUNTER
Recent Visits  No visits were found meeting these conditions.  Showing recent visits within past 90 days and meeting all other requirements  Today's Visits  Date Type Provider Dept   06/25/25 Appointment Ruben Ruiz MD [place] [id number]   Showing today's visits and meeting all other requirements  Future Appointments  No visits were found meeting these conditions.  Showing future appointments within next 90 days and meeting all other requirements

## 2025-06-25 NOTE — PROGRESS NOTES
Subjective   Patient ID: Sandy Nguyen is a 44 y.o. female who presents for Follow-up.  History of Present Illness  The patient presents for a follow-up visit.    She reports a general sense of well-being. Her current medication regimen includes Lipitor, citalopram, Cozaar, and Mounjaro, which she obtains from the hospital pharmacy. She also uses a topical cream as part of her treatment plan. She was supposed to get blood work done but has not done that yet.    She has achieved a significant weight loss of 57 pounds, reducing her weight from 190 to 133 pounds. This weight loss has necessitated the purchase of new clothing.    She continues to experience symptoms of hidradenitis, which is particularly bothersome in the right groin area near the vaginal region. She believes that hair growth may be contributing to the issue. She has expressed a preference for avoiding surgical intervention at this time and has declined the use of Humira.  See Above  Review of Systems  12 Systems have been reviewed as follows.  Constitutional: Fever, weight gain, weight loss, appetite change, night sweats, fatigue, chills.  Eyes : blurry, double vision, vision, loss, tearing, redness, pain, sensitivity to light, glaucoma.  Ears, nose, mouth, and throat: Hearing loss, ringing in the ears, ear pain, nasal congestion, nasal drainage, nosebleeds, mouth, throat, irritation tooth problem.  Cardiovascular :chest pain, pressure, heart racing, palpitations, sweating, leg swelling, high or low blood pressure  Pulmonary: Cough, yellow or green sputum, blood and sputum, shortness of breath, wheezing  Gastrointestinal: Nausea, vomiting, diarrhea, constipation, pain, blood in stool, or vomitus, heartburn, difficulty swallowing  Genitourinary: incontinence, abnormal bleeding, abnormal discharge, urinary frequency, urinary hesitancy, pain, impotence sexual problem, infection, urinary retention  Musculoskeletal: Pain, stiffness, joint, redness or  "warmth, arthritis, back pain, weakness, muscle wasting, sprain or fracture  Neuro: Weight weakness, dizziness, change in voice, change in taste change in vision, change in hearing, loss, or change of sensation, trouble walking, balance problems coordination problems, shaking, speech problem  Endocrine , cold or heat intolerance, blood sugar problem, weight gain or loss missed periods hot flashes, sweats, change in body hair, change in libido, increased thirst, increased urination  Heme/lymph: Swelling, bleeding, problem anemia, bruising, enlarged lymph nodes  Allergic/immunologic: H. plus nasal drip, watery itchy eyes, nasal drainage, immunosuppressed  The above were reviewed and noted negative except as noted in HPI and Problem List.    Objective     /80 (BP Location: Left arm, Patient Position: Sitting, BP Cuff Size: Adult)   Pulse 87   Temp 36.4 °C (97.5 °F) (Temporal)   Resp 16   Ht 1.626 m (5' 4\")   Wt 60.4 kg (133 lb 3.2 oz)   SpO2 97%   BMI 22.86 kg/m²      Physical Exam    Constitutional: Well developed, well nourished, alert and in no acute distress   Eyes: Normal external exam. Pupils equally round and reactive to light with normal accommodation and extraocular movements intact.  Neck: Supple, no lymphadenopathy or masses.   Cardiovascular: Regular rate and rhythm, normal S1 and S2, no murmurs, gallops, or rubs. Radial pulses normal. No peripheral edema.  Pulmonary: No respiratory distress, lungs clear to auscultation bilaterally. No wheezes, rhonchi, rales.  Abdomen: soft,non tender, non distended, without masses or HSM  Skin: Warm, well perfused, normal skin turgor and color.   Neurologic: Cranial nerves II-XII grossly intact.   Psychiatric: Mood calm and affect normal  Musculoskeletal: Moving all extremities without restriction  The above were reviewed and noted negative except as noted in HPI and Problem List.      Results           Assessment & Plan  1. Polycystic ovarian syndrome.  - " Blood pressure readings are within normal range.  - Weight loss of 57 pounds has positively impacted hirsutism.  - Cholesterol levels are expected to show improvement.  - Continue current medications: atorvastatin, citalopram, losartan, and the prescribed cream. Multivitamin is not needed. Blood work to be completed as per the previous order.    2. Hidradenitis suppurativa.  - Condition is stable but has a persistent spot in the right groin area.  - Prefers not to pursue Humira at this time.  - Advised to keep the area shaved to prevent ingrown hairs.  - Will inform if decides to try Humira or seek another opinion.    3. Weight management.  - Lost 57 pounds, going from 190 pounds to 133 pounds.  - Significant improvement in overall health.  - Advised to continue current weight management regimen.    Follow-up  - Follow up in 3 months.    Problem List Items Addressed This Visit       Hypertension    Relevant Medications    losartan (Cozaar) 25 mg tablet    Other Relevant Orders    Follow Up In [facility] - PCP - Established    Elevated lipoprotein(a)    Relevant Orders    Follow Up [facility] - PCP - Established    Reactive depression    Relevant Medications    citalopram (CeleXA) 20 mg tablet    Other Relevant Orders    Follow Up In [facility] - PCP - Established    Eczema    Relevant Medications    triamcinolone (Kenalog) 0.1 % cream    Other Relevant Orders    Follow Up In [facility] - PCP - Established    PCOS (polycystic ovarian syndrome)    Relevant Orders    Follow Up In [facility] - PCP - Established    Pure hypercholesterolemia    Relevant Medications    atorvastatin (Lipitor) 20 mg tablet    Other Relevant Orders    Follow Up [facility] - PCP - Established    Vitamin B 12 deficiency    Relevant Orders    Follow Up In [facility] - PCP - Established    Class 1 obesity due to excess calories without serious comorbidity with body  mass index (BMI) of 31.0 to 31.9 in adult    Relevant Medications    tirzepatide (Mounjaro) 2.5 mg/0.5 mL pen injector (Start on 6/29/2025)    Other Relevant Orders    Follow Up [facility] - PCP - Established    Osteoarthrosis    Relevant Orders    Follow Up [facility] - PCP - Established    Hidradenitis suppurativa    Relevant Orders    Follow Up [facility] - PCP - Established    Fatigue    Relevant Orders    Follow Up [facility] - PCP - Established    Vitamin D deficiency    Relevant Orders    Follow Up [facility] - PCP - Established     Other Visit Diagnoses         Left upper quadrant abdominal pain        Relevant Orders    Follow Up [facility] - PCP - Established      Frequent PVCs        Relevant Orders    Follow Up [facility] - PCP - Established      Need for influenza vaccination        Relevant Orders    Follow Up [facility] - PCP - Established      Cellulitis of right lower extremity        Relevant Orders    Follow Up In [facility] - PCP - Established      Yeast vaginitis        Relevant Orders    Follow Up [facility] - PCP - Established           Continue current medications and therapy for chronic medical conditions     Cont. Mounjaro 2.5 mg weekly     -Discuss humira for hidradenitis in the future      Doing well     Stop neosporin        -All dx improved      Patient was advised importance of proper diet/nutrition in addition adequate hydration. Patient was encouraged moderate exercise program to include 30 minutes daily for 5 days of the week or 150 minutes weekly. Patient will follow-up with us as scheduled.  Ruben Ruiz MD       This medical note was created with the assistance of artificial intelligence (AI) for documentation purposes. The content has been reviewed and confirmed by the healthcare provider for accuracy and completeness. Patient consented to  the use of audio recording and use of AI during their visit.

## 2025-06-28 ENCOUNTER — PHARMACY VISIT (OUTPATIENT)
Dept: PHARMACY | Facility: CLINIC | Age: 45
End: 2025-06-28
Payer: COMMERCIAL

## 2025-07-21 PROCEDURE — RXMED WILLOW AMBULATORY MEDICATION CHARGE

## 2025-07-23 ENCOUNTER — PHARMACY VISIT (OUTPATIENT)
Dept: PHARMACY | Facility: CLINIC | Age: 45
End: 2025-07-23
Payer: COMMERCIAL

## 2025-08-13 ENCOUNTER — APPOINTMENT (OUTPATIENT)
Dept: PHARMACY | Facility: HOSPITAL | Age: 45
End: 2025-08-13
Payer: COMMERCIAL

## 2025-08-13 DIAGNOSIS — E66.811 CLASS 1 OBESITY DUE TO EXCESS CALORIES WITHOUT SERIOUS COMORBIDITY WITH BODY MASS INDEX (BMI) OF 31.0 TO 31.9 IN ADULT: ICD-10-CM

## 2025-08-13 DIAGNOSIS — E66.09 CLASS 1 OBESITY DUE TO EXCESS CALORIES WITHOUT SERIOUS COMORBIDITY WITH BODY MASS INDEX (BMI) OF 31.0 TO 31.9 IN ADULT: ICD-10-CM

## 2025-08-13 PROCEDURE — RXMED WILLOW AMBULATORY MEDICATION CHARGE

## 2025-08-13 RX ORDER — TIRZEPATIDE 2.5 MG/.5ML
2.5 INJECTION, SOLUTION SUBCUTANEOUS
Qty: 6 ML | Refills: 1 | Status: SHIPPED | OUTPATIENT
Start: 2025-08-17

## 2025-08-15 ENCOUNTER — PHARMACY VISIT (OUTPATIENT)
Dept: PHARMACY | Facility: CLINIC | Age: 45
End: 2025-08-15
Payer: COMMERCIAL

## 2025-09-24 ENCOUNTER — APPOINTMENT (OUTPATIENT)
Dept: PRIMARY CARE | Facility: CLINIC | Age: 45
End: 2025-09-24
Payer: COMMERCIAL

## 2025-11-12 ENCOUNTER — APPOINTMENT (OUTPATIENT)
Dept: PHARMACY | Facility: HOSPITAL | Age: 45
End: 2025-11-12
Payer: COMMERCIAL